# Patient Record
Sex: FEMALE | Race: WHITE
[De-identification: names, ages, dates, MRNs, and addresses within clinical notes are randomized per-mention and may not be internally consistent; named-entity substitution may affect disease eponyms.]

---

## 2017-01-01 ENCOUNTER — HOSPITAL ENCOUNTER (EMERGENCY)
Dept: HOSPITAL 62 - ER | Age: 57
Discharge: LEFT BEFORE DISCHARGE | End: 2017-01-01
Payer: SELF-PAY

## 2017-01-01 DIAGNOSIS — Z53.21: Primary | ICD-10-CM

## 2017-04-15 ENCOUNTER — HOSPITAL ENCOUNTER (EMERGENCY)
Dept: HOSPITAL 62 - ER | Age: 57
Discharge: HOME | End: 2017-04-15
Payer: SELF-PAY

## 2017-04-15 VITALS — SYSTOLIC BLOOD PRESSURE: 139 MMHG | DIASTOLIC BLOOD PRESSURE: 88 MMHG

## 2017-04-15 DIAGNOSIS — R07.89: ICD-10-CM

## 2017-04-15 DIAGNOSIS — M54.5: ICD-10-CM
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
## 2017-04-15 DIAGNOSIS — F17.210: ICD-10-CM

## 2017-04-15 DIAGNOSIS — R07.9: Primary | ICD-10-CM

## 2017-04-15 LAB
ALBUMIN SERPL-MCNC: 4.3 G/DL (ref 3.5–5)
ALP SERPL-CCNC: 91 U/L (ref 38–126)
ALT SERPL-CCNC: 26 U/L (ref 9–52)
ANION GAP SERPL CALC-SCNC: 12 MMOL/L (ref 5–19)
AST SERPL-CCNC: 19 U/L (ref 14–36)
BASOPHILS # BLD AUTO: 0.1 10^3/UL (ref 0–0.2)
BASOPHILS NFR BLD AUTO: 0.8 % (ref 0–2)
BILIRUB DIRECT SERPL-MCNC: 0.1 MG/DL (ref 0–0.4)
BILIRUB SERPL-MCNC: 0.8 MG/DL (ref 0.2–1.3)
BUN SERPL-MCNC: 12 MG/DL (ref 7–20)
CALCIUM: 9.4 MG/DL (ref 8.4–10.2)
CHLORIDE SERPL-SCNC: 104 MMOL/L (ref 98–107)
CK MB SERPL-MCNC: < 0.22 NG/ML (ref ?–4.55)
CK SERPL-CCNC: 41 U/L (ref 30–135)
CO2 SERPL-SCNC: 26 MMOL/L (ref 22–30)
CREAT SERPL-MCNC: 0.57 MG/DL (ref 0.52–1.25)
EOSINOPHIL # BLD AUTO: 0.1 10^3/UL (ref 0–0.6)
EOSINOPHIL NFR BLD AUTO: 1.2 % (ref 0–6)
ERYTHROCYTE [DISTWIDTH] IN BLOOD BY AUTOMATED COUNT: 13.4 % (ref 11.5–14)
GLUCOSE SERPL-MCNC: 99 MG/DL (ref 75–110)
HCT VFR BLD CALC: 39.6 % (ref 36–47)
HGB BLD-MCNC: 13.8 G/DL (ref 12–15.5)
HGB HCT DIFFERENCE: 1.8
LYMPHOCYTES # BLD AUTO: 2.2 10^3/UL (ref 0.5–4.7)
LYMPHOCYTES NFR BLD AUTO: 21.4 % (ref 13–45)
MCH RBC QN AUTO: 31.5 PG (ref 27–33.4)
MCHC RBC AUTO-ENTMCNC: 34.9 G/DL (ref 32–36)
MCV RBC AUTO: 90 FL (ref 80–97)
MONOCYTES # BLD AUTO: 0.6 10^3/UL (ref 0.1–1.4)
MONOCYTES NFR BLD AUTO: 5.7 % (ref 3–13)
NEUTROPHILS # BLD AUTO: 7.2 10^3/UL (ref 1.7–8.2)
NEUTS SEG NFR BLD AUTO: 70.9 % (ref 42–78)
POTASSIUM SERPL-SCNC: 3.8 MMOL/L (ref 3.6–5)
PROT SERPL-MCNC: 7.1 G/DL (ref 6.3–8.2)
PROTHROMBIN TIME: 12.7 SEC (ref 11.4–15.4)
RBC # BLD AUTO: 4.39 10^6/UL (ref 3.72–5.28)
SODIUM SERPL-SCNC: 142.4 MMOL/L (ref 137–145)
TROPONIN I SERPL-MCNC: < 0.012 NG/ML
WBC # BLD AUTO: 10.2 10^3/UL (ref 4–10.5)

## 2017-04-15 PROCEDURE — 85025 COMPLETE CBC W/AUTO DIFF WBC: CPT

## 2017-04-15 PROCEDURE — 99285 EMERGENCY DEPT VISIT HI MDM: CPT

## 2017-04-15 PROCEDURE — 85610 PROTHROMBIN TIME: CPT

## 2017-04-15 PROCEDURE — 71010: CPT

## 2017-04-15 PROCEDURE — 93005 ELECTROCARDIOGRAM TRACING: CPT

## 2017-04-15 PROCEDURE — 82553 CREATINE MB FRACTION: CPT

## 2017-04-15 PROCEDURE — 93010 ELECTROCARDIOGRAM REPORT: CPT

## 2017-04-15 PROCEDURE — 36415 COLL VENOUS BLD VENIPUNCTURE: CPT

## 2017-04-15 PROCEDURE — 82550 ASSAY OF CK (CPK): CPT

## 2017-04-15 PROCEDURE — 80053 COMPREHEN METABOLIC PANEL: CPT

## 2017-04-15 PROCEDURE — 84484 ASSAY OF TROPONIN QUANT: CPT
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
## 2017-04-15 NOTE — EKG REPORT
SEVERITY:- OTHERWISE NORMAL ECG -

SINUS RHYTHM

LOW VOLTAGE IN FRONTAL LEADS

:

Confirmed by: Ralph Waterman MD 15-Apr-2017 18:05:43

## 2017-12-30 ENCOUNTER — HOSPITAL ENCOUNTER (EMERGENCY)
Dept: HOSPITAL 62 - ER | Age: 57
Discharge: LEFT BEFORE DISCHARGE | End: 2017-12-30
Payer: SELF-PAY

## 2017-12-30 VITALS — DIASTOLIC BLOOD PRESSURE: 77 MMHG | SYSTOLIC BLOOD PRESSURE: 143 MMHG

## 2017-12-30 DIAGNOSIS — R11.2: ICD-10-CM

## 2017-12-30 DIAGNOSIS — I10: ICD-10-CM

## 2017-12-30 DIAGNOSIS — R51: ICD-10-CM

## 2017-12-30 DIAGNOSIS — J44.9: ICD-10-CM

## 2017-12-30 DIAGNOSIS — R42: Primary | ICD-10-CM

## 2017-12-30 PROCEDURE — 93005 ELECTROCARDIOGRAM TRACING: CPT

## 2017-12-30 PROCEDURE — 99284 EMERGENCY DEPT VISIT MOD MDM: CPT

## 2017-12-30 PROCEDURE — 93010 ELECTROCARDIOGRAM REPORT: CPT

## 2017-12-30 NOTE — ER DOCUMENT REPORT
ED Medical Screen (RME)





- General


Chief Complaint: Vomiting


Stated Complaint: DIZZY, VOMITING


Time Seen by Provider: 12/30/17 18:29


Information source: Patient


Notes: 





57-year-old female who presents today with the onset yesterday of a mild 

frontal headache and some "dizziness".  When I asked her to describe this she 

explains both lightheadedness and vertigo.  She denies any blurry vision, neck 

pain, chest pain, abdominal pain, weakness or numbness.  She does state some 

nausea and vomiting without diarrhea.


TRAVEL OUTSIDE OF THE [place] IN LAST 30 DAYS: No





- HPI


Onset: Other - See above


Onset/Duration: Gradual


Quality of pain: Achy


Severity: Mild


Pain Level: 1


Associated Symptoms: Other - See above


Exacerbated by: Denies


Relieved by: Denies


Similar symptoms previously: Yes


Recently seen / treated by doctor: No





- Related Data


Allergies/Adverse Reactions: 


 





No Known Allergies Allergy (Verified 12/30/17 16:52)


 











Past Medical History





- General


Information source: Patient





- Social History


Cigarette use (# per day): No


Chew tobacco use (# tins/day): No


Frequency of alcohol use: None


Drug Abuse: None


Family history: CVA





- Past Medical History


Cardiac Medical History: Reports: Hx Hypertension - not taking meds


Pulmonary Medical History: Reports: Hx Bronchitis, Hx COPD


Renal/ Medical History: Denies: Hx Peritoneal Dialysis


Musculoskeltal Medical History: Reports Hx Arthritis


Psychiatric Medical History: Reports: Hx Depression


Past Surgical History: Reports: Hx Gynecologic Surgery - part of cervix, Hx 

Hysterectomy





- Immunizations


Hx Diphtheria, Pertussis, Tetanus Vaccination: Yes





Review of Systems





- Review of Systems


Constitutional: denies: Fever


EENT: denies: Eye discharge, Nose discharge


Cardiovascular: denies: Chest pain, Palpitations


Respiratory: denies: Cough, Short of breath


Gastrointestinal: Vomiting


Genitourinary: denies: Dysuria


Musculoskeletal: denies: Leg swelling


Skin: Other - no hives.  denies: Rash


-: Yes All other systems reviewed and negative





Physical Exam





- Vital signs


Vitals: 





 











Temp Pulse Resp BP Pulse Ox


 


 98.5 F   73   20   143/77 H  98 


 


 12/30/17 17:05  12/30/17 17:05  12/30/17 17:05  12/30/17 17:05  12/30/17 17:05











Notes: 





Reviewed vital signs and nursing note as charted by RN.





CONSTITUTIONAL: Alert and oriented and responds appropriately to questions. Well

-appearing; well-nourished





HEAD: Normocephalic; atraumatic





EYES: PERRL; Conjunctivae clear, sclerae non-icteric





ENT: Normal nose; no rhinorrhea; moist mucous membranes; pharynx without 

lesions noted





NECK: Supple without meningismus; carotid bruit; non-tender; no cervical 

lymphadenopathy, no masses





CARD: Regular rate and rhythm; no murmurs





RESP: Normal chest excursion without splinting or tachypnea; breath sounds 

clear and equal bilaterally





ABD/GI: Normal bowel sounds; non-distended; soft, non-tender





BACK: The back appears normal and is non-tender to palpation, there is no CVA 

tenderness





EXT: Normal ROM in all joints; non-tender to palpation; no cyanosis, no 

effusions, no edema





SKIN: No acute lesions noted





NEURO: CN II through XII are intact.  Patient has 5 out of 5 bilateral upper 

and lower extremity strength with sensation intact light touch.  No nystagmus.  

Normal finger to nose














Course





- Re-evaluation


Re-evalutation: 





12/30/17 18:40


Given the above history and physical examination, I would like to rule out the 

possibility of a CVA causing the vertigo as well as an EKG, basic labs, with 

reassessment.





Patient is very upset that she had a weight in the waiting room greater than 1 

hour.  She feels that she would like to be in a room and not in the triage 

room.  I have explained to her that this helps expedite the process so when a 

room is ready laboratory values and imaging already.





Patient states that she knows that she has "vertigo" she has had this before.  

I have explained to her that I would like to rule out the possibility of a more 

serious concern given the signs and symptoms of vertigo that often mimic a 

posterior stroke.  I have explained to her that if the workup is unremarkable, 

I will indeed treat her for vertigo.





Despite our attempts to make the patient stay for complete workup she is 

leaving AGAINST MEDICAL ADVICE.  She understands the risks and benefits.  

Patient is oriented 4 and has capacity to make this decision in my judgment.  

She has been welcomed to return at any time that she would like for further 

assessment.





- Vital Signs


Vital signs: 





 











Temp Pulse Resp BP Pulse Ox


 


 98.5 F   73   20   143/77 H  98 


 


 12/30/17 17:05  12/30/17 17:05  12/30/17 17:05  12/30/17 17:05  12/30/17 17:05














Doctor's Discharge





- Discharge


Clinical Impression: 


 Dizziness





Vomiting


Qualifiers:


 Vomiting type: unspecified Vomiting Intractability: non-intractable Nausea 

presence: with nausea Qualified Code(s): R11.2 - Nausea with vomiting, 

unspecified





Condition: Fair


Disposition: AGAINST MEDICAL ADVICE


Additional Instructions: 


Please feel free to return at anytime that you would like for further 

assessment and evaluation.  Please make sure you follow-up with your primary 

care physician as we have discussed.


Prescriptions: 


Meclizine HCl [Antivert 25 mg Tablet] 25 mg PO TID PRN #21 tablet


 PRN Reason:

## 2017-12-30 NOTE — ER DOCUMENT REPORT
ED Medical Screen (RME)





- General


Chief Complaint: Vomiting


Stated Complaint: DIZZY, VOMITING


Time Seen by Provider: 12/30/17 18:29


Information source: Patient


Notes: 





57-year-old female who presents today with the onset yesterday of a mild 

frontal headache and some "dizziness".  When I asked her to describe this she 

explains both lightheadedness and vertigo.  She denies any blurry vision, neck 

pain, chest pain, abdominal pain, weakness or numbness.  She does state some 

nausea and vomiting without diarrhea.








TRAVEL OUTSIDE OF THE [place] IN LAST 30 DAYS: No





- Related Data


Allergies/Adverse Reactions: 


 





No Known Allergies Allergy (Verified 12/30/17 16:52)


 











Past Medical History





- Past Medical History


Cardiac Medical History: Reports: Hx Hypertension - not taking meds


Pulmonary Medical History: Reports: Hx Bronchitis, Hx COPD


Renal/ Medical History: Denies: Hx Peritoneal Dialysis


Musculoskeltal Medical History: Reports Hx Arthritis


Psychiatric Medical History: Reports: Hx Depression


Past Surgical History: Reports: Hx Gynecologic Surgery - part of cervix, Hx 

Hysterectomy - partial





- Immunizations


Hx Diphtheria, Pertussis, Tetanus Vaccination: Yes





Physical Exam





- Vital signs


Vitals: 





 











Temp Pulse Resp BP Pulse Ox


 


 98.5 F   73   20   143/77 H  98 


 


 12/30/17 17:05  12/30/17 17:05  12/30/17 17:05  12/30/17 17:05  12/30/17 17:05














Course





- Vital Signs


Vital signs: 





 











Temp Pulse Resp BP Pulse Ox


 


 98.5 F   73   20   143/77 H  98 


 


 12/30/17 17:05  12/30/17 17:05  12/30/17 17:05  12/30/17 17:05  12/30/17 17:05

## 2017-12-31 ENCOUNTER — HOSPITAL ENCOUNTER (EMERGENCY)
Dept: HOSPITAL 62 - ER | Age: 57
Discharge: HOME | End: 2017-12-31
Payer: SELF-PAY

## 2017-12-31 VITALS — DIASTOLIC BLOOD PRESSURE: 78 MMHG | SYSTOLIC BLOOD PRESSURE: 168 MMHG

## 2017-12-31 DIAGNOSIS — R42: ICD-10-CM

## 2017-12-31 DIAGNOSIS — K52.9: ICD-10-CM

## 2017-12-31 DIAGNOSIS — I10: ICD-10-CM

## 2017-12-31 DIAGNOSIS — R11.2: ICD-10-CM

## 2017-12-31 DIAGNOSIS — N39.0: Primary | ICD-10-CM

## 2017-12-31 DIAGNOSIS — R10.9: ICD-10-CM

## 2017-12-31 DIAGNOSIS — R51: ICD-10-CM

## 2017-12-31 LAB
ADD MANUAL DIFF: NO
ALBUMIN SERPL-MCNC: 4.5 G/DL (ref 3.5–5)
ALP SERPL-CCNC: 86 U/L (ref 38–126)
ALT SERPL-CCNC: 30 U/L (ref 9–52)
ANION GAP SERPL CALC-SCNC: 15 MMOL/L (ref 5–19)
APPEARANCE UR: (no result)
APTT PPP: (no result) S
AST SERPL-CCNC: 17 U/L (ref 14–36)
BASOPHILS # BLD AUTO: 0 10^3/UL (ref 0–0.2)
BASOPHILS NFR BLD AUTO: 0.5 % (ref 0–2)
BILIRUB DIRECT SERPL-MCNC: 0.3 MG/DL (ref 0–0.4)
BILIRUB SERPL-MCNC: 1.3 MG/DL (ref 0.2–1.3)
BILIRUB UR QL STRIP: NEGATIVE
BUN SERPL-MCNC: 14 MG/DL (ref 7–20)
CALCIUM: 10.5 MG/DL (ref 8.4–10.2)
CHLORIDE SERPL-SCNC: 105 MMOL/L (ref 98–107)
CO2 SERPL-SCNC: 24 MMOL/L (ref 22–30)
EOSINOPHIL # BLD AUTO: 0 10^3/UL (ref 0–0.6)
EOSINOPHIL NFR BLD AUTO: 0.1 % (ref 0–6)
ERYTHROCYTE [DISTWIDTH] IN BLOOD BY AUTOMATED COUNT: 13.1 % (ref 11.5–14)
GLUCOSE SERPL-MCNC: 115 MG/DL (ref 75–110)
GLUCOSE UR STRIP-MCNC: NEGATIVE MG/DL
HCT VFR BLD CALC: 44.7 % (ref 36–47)
HGB BLD-MCNC: 15.4 G/DL (ref 12–15.5)
KETONES UR STRIP-MCNC: 20 MG/DL
LIPASE SERPL-CCNC: 75.4 U/L (ref 23–300)
LYMPHOCYTES # BLD AUTO: 1.3 10^3/UL (ref 0.5–4.7)
LYMPHOCYTES NFR BLD AUTO: 13.7 % (ref 13–45)
MCH RBC QN AUTO: 31.4 PG (ref 27–33.4)
MCHC RBC AUTO-ENTMCNC: 34.4 G/DL (ref 32–36)
MCV RBC AUTO: 91 FL (ref 80–97)
MONOCYTES # BLD AUTO: 0.2 10^3/UL (ref 0.1–1.4)
MONOCYTES NFR BLD AUTO: 2.5 % (ref 3–13)
NEUTROPHILS # BLD AUTO: 8 10^3/UL (ref 1.7–8.2)
NEUTS SEG NFR BLD AUTO: 83.2 % (ref 42–78)
NITRITE UR QL STRIP: NEGATIVE
PH UR STRIP: 7 [PH] (ref 5–9)
PLATELET # BLD: 227 10^3/UL (ref 150–450)
POTASSIUM SERPL-SCNC: 3.9 MMOL/L (ref 3.6–5)
PROT SERPL-MCNC: 7.6 G/DL (ref 6.3–8.2)
PROT UR STRIP-MCNC: 100 MG/DL
RBC # BLD AUTO: 4.89 10^6/UL (ref 3.72–5.28)
SODIUM SERPL-SCNC: 143.7 MMOL/L (ref 137–145)
SP GR UR STRIP: 1.02
TOTAL CELLS COUNTED % (AUTO): 100 %
UROBILINOGEN UR-MCNC: 4 MG/DL (ref ?–2)
WBC # BLD AUTO: 9.6 10^3/UL (ref 4–10.5)

## 2017-12-31 PROCEDURE — 70450 CT HEAD/BRAIN W/O DYE: CPT

## 2017-12-31 PROCEDURE — 99284 EMERGENCY DEPT VISIT MOD MDM: CPT

## 2017-12-31 PROCEDURE — 85025 COMPLETE CBC W/AUTO DIFF WBC: CPT

## 2017-12-31 PROCEDURE — 80053 COMPREHEN METABOLIC PANEL: CPT

## 2017-12-31 PROCEDURE — 87086 URINE CULTURE/COLONY COUNT: CPT

## 2017-12-31 PROCEDURE — S0119 ONDANSETRON 4 MG: HCPCS

## 2017-12-31 PROCEDURE — 87186 SC STD MICRODIL/AGAR DIL: CPT

## 2017-12-31 PROCEDURE — 96361 HYDRATE IV INFUSION ADD-ON: CPT

## 2017-12-31 PROCEDURE — 36415 COLL VENOUS BLD VENIPUNCTURE: CPT

## 2017-12-31 PROCEDURE — 96374 THER/PROPH/DIAG INJ IV PUSH: CPT

## 2017-12-31 PROCEDURE — 81001 URINALYSIS AUTO W/SCOPE: CPT

## 2017-12-31 PROCEDURE — 83690 ASSAY OF LIPASE: CPT

## 2017-12-31 PROCEDURE — 87088 URINE BACTERIA CULTURE: CPT

## 2017-12-31 NOTE — RADIOLOGY REPORT (SQ)
EXAM DESCRIPTION:  CT HEAD WITHOUT



COMPLETED DATE/TIME:  12/31/2017 8:14 pm



REASON FOR STUDY:  dizziness



COMPARISON:  October 2016



TECHNIQUE:  Axial images acquired through the brain without intravenous contrast.  Images reviewed wi
th bone, brain and subdural windows.  Images stored on PACS.

All CT scanners at this facility use dose modulation, iterative reconstruction, and/or weight based d
osing when appropriate to reduce radiation dose to as low as reasonably achievable (ALARA).

[facility]: Dose Right  [facility]: CareDose    [facility]: Dose Right    [facility]: Teradose 4D    [facility]: Smart Technologies



RADIATION DOSE:  CT Rad equipment meets quality standard of care and radiation dose reduction techniq
ues were employed. CTDIvol: 64.6 mGy. DLP: 1034 mGy-cm. mGy.



LIMITATIONS:  None.



FINDINGS:  VENTRICLES: Normal size and contour.

CEREBRUM: No masses.  No hemorrhage.  No midline shift.  No evidence for acute infarction. Normal gra
y/white matter differentiation. No areas of low density in the white matter.

CEREBELLUM: No masses.  No hemorrhage.  No alteration of density.  No evidence for acute infarction.

EXTRAAXIAL SPACES: No fluid collections.  No masses.

ORBITS AND GLOBE: No intra- or extraconal masses.  Normal contour of globe without masses.

CALVARIUM: No fracture.

PARANASAL SINUSES: Mucosal polyp retention cyst is identified in the left maxillary antra.

SOFT TISSUES: No mass or hematoma.

OTHER: No other significant finding.



IMPRESSION:  NORMAL BRAIN CT WITHOUT CONTRAST.

EVIDENCE OF ACUTE STROKE: NO.



COMMENT:  Quality ID # [id number]: Final reports with documentation of one or more dose reduction techniques
 (e.g., Automated exposure control, adjustment of the mA and/or kV according to patient size, use of 
iterative reconstruction technique)



TECHNICAL DOCUMENTATION:  JOB ID:  [id number]

 [employer]- All Rights Reserved

## 2017-12-31 NOTE — ER DOCUMENT REPORT
ED General





- General


Chief Complaint: Nausea/Vomiting


Stated Complaint: NAUSEA AND VOMITING


Time Seen by Provider: 12/31/17 16:58


TRAVEL OUTSIDE OF THE [place] IN LAST 30 DAYS: No





- HPI


Notes: 





Patient is a 57-year-old female with a history of hypertension who presents the 

ED complaining of 2 days of feeling dizzy, nausea/vomiting without abdominal 

pain, and a right frontal mild headache.  Patient was evaluated yesterday while 

in the ED and left AMA.  The provider yesterday wanted to perform labs and 

imaging to rule out posterior CVA and other etiologies before diagnosing vertigo

, but patient declined.  Patient returns today stating that her dizziness has 

increased she has not been able to keep any fluids or food down.  Patient 

states that she is still urinating and having normal bowel movements.  Patient 

states that she has occasional nasal congestion patient states that the 

dizziness is worse with.  Sitting and standing, and is improved with lying 

down.  Patient states that she did have some medicine when she arrived to the 

ED today, Zofran and meclizine, which helped.  Patient states that she is open 

to having testing performed today.  She denies any drug allergies.  Denies any 

cardiac history, CVA, TIA, cancer, blood clots.  Denies any fever, head injury, 

neck pain, changes in vision/speech/mentation/hearing, sore throat, chest pain, 

palpitations, syncope, cough, shortness of breath, wheeze, dyspnea, abdominal 

pain, nausea/vomiting/diarrhea, urinary retention, dysuria, hematuria, loss of 

control of bowel or bladder, numbness/tingling, saddle anesthesia, muscle 

paralysis/weakness, or rash.





- Related Data


Allergies/Adverse Reactions: 


 





No Known Allergies Allergy (Verified 12/31/17 16:23)


 











Past Medical History





- Social History


Smoking Status: Never Smoker


Chew tobacco use (# tins/day): No


Frequency of alcohol use: None


Drug Abuse: None


Family History: Reviewed & Not Pertinent


Patient has suicidal ideation: No


Patient has homicidal ideation: No





- Past Medical History


Cardiac Medical History: Reports: Hx Hypertension - not taking meds


Pulmonary Medical History: Reports: Hx Bronchitis, Hx COPD


Renal/ Medical History: Denies: Hx Peritoneal Dialysis


Musculoskeltal Medical History: Reports Hx Arthritis


Psychiatric Medical History: Reports: Hx Depression


Past Surgical History: Reports: Hx Gynecologic Surgery - part of cervix, Hx 

Hysterectomy





- Immunizations


Hx Diphtheria, Pertussis, Tetanus Vaccination: Yes





Review of Systems





- Review of Systems


Notes: 





REVIEW OF SYSTEMS:


CONSTITUTIONAL :  Denies fever,  chills, or sweats.  Denies recent illness.


EENT:  see hpi


CARDIOVASCULAR:  Denies chest pain.  Denies palpitations or racing or irregular 

heart beat.  Denies ankle edema.


RESPIRATORY:  Denies cough, cold, or chest congestion.  Denies shortness of 

breath, difficulty breathing, or wheezing.


GASTROINTESTINAL:  see hpi


GENITOURINARY:  Denies difficulty urinating, painful urination, burning, 

frequency, blood in urine, or discharge.


MUSCULOSKELETAL:  Denies back or neck pain or stiffness.  Denies joint pain or 

swelling.


SKIN:   Denies rash, lesions or sores.


NEUROLOGICAL:  see hpi. Denies confusion or altered mental status.  Denies 

passing out or loss of consciousness.  Denies weakness or paralysis or loss of 

use of either side.  Denies problems with gait or speech.  Denies sensory loss, 

numbness, or tingling.  Denies seizures.


PSYCHIATRIC:  Denies anxiety or stress.  Denies depression, suicidal ideation, 

or homicidal ideation.





ALL OTHER SYSTEMS REVIEWED AND NEGATIVE.





Dictation was performed using Dragon voice recognition software





Physical Exam





- Vital signs


Vitals: 


 











Temp Pulse Resp BP Pulse Ox


 


 98.5 F   70   18   168/78 H  99 


 


 12/31/17 17:01  12/31/17 17:01  12/31/17 17:01  12/31/17 17:01  12/31/17 17:01











Notes: 





PHYSICAL EXAMINATION:





GENERAL: Well-appearing, well-nourished and in no acute distress.  A&Ox4





HEAD: Atraumatic, normocephalic.  Non-tender.  No francisco sign





EYES: Pupils equal round and reactive to light, extraocular movements intact, 

sclera anicteric, conjunctiva are normal.  No nystagmus. vis fields intact.





ENT: EAC clear b/l.  TM's intact b/l without erythema, fluid, or perforation.  

Nares patent and without discharge.  oropharynx clear without exudates.  No 

tonsilar hypertrophy or erythema.  Moist mucous membranes.  No sinus 

tenderness.  





NECK: Normal range of motion, supple without lymphadenopathy.  No rigidity.  No 

midline tenderness. Spurling negative.  





LUNGS: Breath sounds clear to auscultation bilaterally and equal.  No wheezes 

rales or rhonchi.





HEART: Regular rate and rhythm without murmurs, rubs, gallops.





ABDOMEN: Soft, nontender, nondistended abdomen.  No guarding, no rebound.  No 

masses appreciated.  Normal bowel sounds present.  No CVA tenderness 

bilaterally.   





Musculoskeletal: Ext b/l:  FROM to passive/active. Strength 5+/5.  No deficits 

noted.  No bony tenderness of extremities.





Back:  FROM to passive/active.  Strength 5+/5.  No vertebral point tenderness, 

stepoffs, or deformities.  No other bony tenderness or ecchymosis.  





Extremities:  No cyanosis, clubbing, or edema b/l.  Peripheral pulses 2+.  

Capillary refill less than 2 seconds.





NEUROLOGICAL: GCS 15. NIH 0. MMSE intact.  Cranial nerves grossly intact.  

Normal speech, normal gait.  Normal sensory, motor exams.  Reflexes 2+ b/l. GUILLAUME'

s negative.  Pronator drift negative. Heel/shin, finger/nose wnl. 





PSYCH: Normal mood, normal affect.





SKIN: Warm, Dry, normal turgor, no rashes or lesions noted.





Course





- Re-evaluation


Re-evalutation: 





12/31/17 21:18


Patient is an afebrile, well-hydrated, 57-year-old female who presents to the 

ED with vertigo, possible gastroenteritis, and acute UTI based on H&P today.  

Vitals are stable.  PE is otherwise unremarkable.  Pt is tolerating PO and has 

not vomited while in the ED.  Her abdominal exam was unremarkable.  Patient has 

no focal neurological deficits, GCS 15, MMSE intact, & NIH 0.  CT scan of the 

head was unremarkable for acute pathology.  EKG, CBC, CMP, lipase unremarkable 

for any acute pathology.  See urinalysis results.  Urine culture is pending.  

Patient was given 2 L normal saline today as well as Zofran and meclizine.  

Patient does have a prescription at home for her meclizine.  Rocephin 1 g given 

IV today along with her first dose of Keflex.  Low suspicion for any acute 

glaucoma, temporal arteritis, meningitis, intracranial hemorrhage, ischemic 

stroke, fracture, acute appendicitis, bowel obstruction, acute cholecystitis, 

acute cholangitis, perforated diverticulitis, incarcerated hernia, pancreatitis

, perforated ulcer, peritonitis, sepsis, pelvic inflammatory disease, ectopic 

pregnancy, tubo-ovarian abscess, ovarian torsion, or other systemic emergent 

condition at this time.  Patient is aware that her condition can change from 

initial presentation and she needs to monitor symptoms closely and seek medical 

attention if any acute changes.  I will send her home with a Rx for Keflex to 

take as directed.  Conservative measures otherwise for symptoms. Recheck with 

your PCM in 3-5 days.  Consider consult with a gastroenterologist.  Return to 

the ED with any worsening/concerning symptoms otherwise as reviewed in 

discharge.  Patient is in agreement.  Reviewed with Dr. Valdez who is in 

agreement with discharge/plan.











- Vital Signs


Vital signs: 


 











Temp Pulse Resp BP Pulse Ox


 


 98.5 F   70   18   168/78 H  99 


 


 12/31/17 17:01  12/31/17 17:01  12/31/17 17:01  12/31/17 17:01  12/31/17 17:01














- Laboratory


Result Diagrams: 


 12/31/17 20:20





 12/31/17 20:20


Laboratory results interpreted by me: 


 











  12/31/17 12/31/17 12/31/17





  20:20 20:20 20:20


 


Seg Neutrophils %  83.2 H  


 


Monocytes %  2.5 L  


 


Glucose   115 H 


 


Calcium   10.5 H 


 


Urine Protein    100 H


 


Urine Ketones    20 H


 


Urine Urobilinogen    4.0 H


 


Ur Leukocyte Esterase    TRACE H














Discharge





- Discharge


Clinical Impression: 


 Acute UTI, Vertigo, Gastroenteritis





Condition: Stable


Disposition: HOME, SELF-CARE


Instructions:  Antinausea Medication ([facility]), Cephalexin ([facility]), Gastroenteritis (

adult) ([facility]), Urinary Tract Infection ([facility]), Vertigo ([facility]), Vomiting ([facility])


Additional Instructions: 


Push fluids (i.e. water, cranberry juice)


Proper hygenic technique


[place] diet (Bananas, rice, apples, toast, etc)


Keep the skin clean


Tylenol as needed


May use over the counter AZO for burning with urination


Take medications as directed


F/u with your PCM in 3-5 days for a recheck


Consider consult with a Urologist/Neurologist for ongoing/worsening symptoms.


Return to the ED with any worsening symptoms and/or development of fever, 

headache, chest pain, palpitations, syncope, shortness of breath, trouble 

breathing, abdominal pain, n/v/d, blood in stool/urine, loss of control of bowel

/bladder, urinary retention, or other worsening symptoms that are concerning to 

you.


Prescriptions: 


Cephalexin Monohydrate [Keflex 500 mg Capsule] 500 mg PO BID #14 capsule


Ondansetron [Zofran Odt 4 mg Tablet] 1 - 2 tab PO Q4H PRN #15 tab.rapdis


 PRN Reason: For Nausea/Vomiting


Forms:  Elevated Blood Pressure


Referrals: 


AYSE WHITE MD [EMERITUS] - Follow up as needed


[facility] [Provider Group] - Follow up as needed

## 2017-12-31 NOTE — ER DOCUMENT REPORT
ED Medical Screen (E)





- General


Chief Complaint: Nausea/Vomiting


Stated Complaint: NAUSEA AND VOMITING


Time Seen by Provider: 12/31/17 16:58


Notes: 


This 57-year-old female patient comes emergency room complaining of nausea 

vomiting dizziness.  She was seen here last night for this, seen in the [facility] 

area.  She did have a prescription of meclizine written.  She did refused to 

have a workup for possible posterior circulation stroke.  He was certain it was 

just vertigo according to the records from last night.  She did get a 

prescription for meclizine but did not fill it and has been taking nothing.  

She is complaining about her symptoms getting worse, but does admit that she is 

taking nothing for it.


She states when she turns her head the dizziness gets much worse.


She seemed to be a difficult, very angry person.





I have greeted and performed a rapid initial assessment of this patient.  A 

comprehensive ED assessment and evaluation of the patient, analysis of test 

results and completion of the medical decision making process will be conducted 

by additional ED providers.


TRAVEL OUTSIDE OF THE [place] IN LAST 30 DAYS: No





- Related Data


Allergies/Adverse Reactions: 


 





No Known Allergies Allergy (Verified 12/31/17 16:23)


 











Past Medical History





- Social History


Chew tobacco use (# tins/day): No


Frequency of alcohol use: None


Drug Abuse: None


Family history: CVA





- Past Medical History


Cardiac Medical History: Reports: Hx Hypertension - not taking meds


Pulmonary Medical History: Reports: Hx Bronchitis, Hx COPD


Renal/ Medical History: Denies: Hx Peritoneal Dialysis


Musculoskeltal Medical History: Reports Hx Arthritis


Psychiatric Medical History: Reports: Hx Depression


Past Surgical History: Reports: Hx Gynecologic Surgery - part of cervix, Hx 

Hysterectomy





- Immunizations


Hx Diphtheria, Pertussis, Tetanus Vaccination: Yes





Physical Exam





- Vital signs


Vitals: 





 











Temp Pulse Resp BP Pulse Ox


 


 98.5 F   70   18   168/78 H  99 


 


 12/31/17 17:01  12/31/17 17:01  12/31/17 17:01  12/31/17 17:01  12/31/17 17:01














Course





- Vital Signs


Vital signs: 





 











Temp Pulse Resp BP Pulse Ox


 


 98.5 F   70   18   168/78 H  99 


 


 12/31/17 17:01  12/31/17 17:01  12/31/17 17:01  12/31/17 17:01  12/31/17 17:01

## 2018-07-02 ENCOUNTER — HOSPITAL ENCOUNTER (EMERGENCY)
Dept: HOSPITAL 62 - ER | Age: 58
Discharge: HOME | End: 2018-07-02
Payer: SELF-PAY

## 2018-07-02 VITALS — DIASTOLIC BLOOD PRESSURE: 72 MMHG | SYSTOLIC BLOOD PRESSURE: 152 MMHG

## 2018-07-02 DIAGNOSIS — R42: ICD-10-CM

## 2018-07-02 DIAGNOSIS — I10: ICD-10-CM

## 2018-07-02 DIAGNOSIS — J32.9: Primary | ICD-10-CM

## 2018-07-02 DIAGNOSIS — N39.0: ICD-10-CM

## 2018-07-02 DIAGNOSIS — J44.9: ICD-10-CM

## 2018-07-02 LAB
ADD MANUAL DIFF: NO
ALBUMIN SERPL-MCNC: 4.4 G/DL (ref 3.5–5)
ALP SERPL-CCNC: 84 U/L (ref 38–126)
ALT SERPL-CCNC: 19 U/L (ref 9–52)
ANION GAP SERPL CALC-SCNC: 11 MMOL/L (ref 5–19)
APPEARANCE UR: (no result)
APTT PPP: YELLOW S
AST SERPL-CCNC: 18 U/L (ref 14–36)
BASOPHILS # BLD AUTO: 0.1 10^3/UL (ref 0–0.2)
BASOPHILS NFR BLD AUTO: 0.9 % (ref 0–2)
BILIRUB DIRECT SERPL-MCNC: 0.4 MG/DL (ref 0–0.4)
BILIRUB SERPL-MCNC: 1.3 MG/DL (ref 0.2–1.3)
BILIRUB UR QL STRIP: NEGATIVE
BUN SERPL-MCNC: 11 MG/DL (ref 7–20)
CALCIUM: 9.5 MG/DL (ref 8.4–10.2)
CHLORIDE SERPL-SCNC: 104 MMOL/L (ref 98–107)
CK MB SERPL-MCNC: 0.49 NG/ML (ref ?–4.55)
CK SERPL-CCNC: 46 U/L (ref 30–135)
CO2 SERPL-SCNC: 29 MMOL/L (ref 22–30)
EOSINOPHIL # BLD AUTO: 0.1 10^3/UL (ref 0–0.6)
EOSINOPHIL NFR BLD AUTO: 1.8 % (ref 0–6)
ERYTHROCYTE [DISTWIDTH] IN BLOOD BY AUTOMATED COUNT: 13.2 % (ref 11.5–14)
GLUCOSE SERPL-MCNC: 98 MG/DL (ref 75–110)
GLUCOSE UR STRIP-MCNC: NEGATIVE MG/DL
HCT VFR BLD CALC: 42.4 % (ref 36–47)
HGB BLD-MCNC: 14.6 G/DL (ref 12–15.5)
KETONES UR STRIP-MCNC: NEGATIVE MG/DL
LYMPHOCYTES # BLD AUTO: 1.7 10^3/UL (ref 0.5–4.7)
LYMPHOCYTES NFR BLD AUTO: 23.6 % (ref 13–45)
MCH RBC QN AUTO: 31.6 PG (ref 27–33.4)
MCHC RBC AUTO-ENTMCNC: 34.4 G/DL (ref 32–36)
MCV RBC AUTO: 92 FL (ref 80–97)
MONOCYTES # BLD AUTO: 0.3 10^3/UL (ref 0.1–1.4)
MONOCYTES NFR BLD AUTO: 4.5 % (ref 3–13)
NEUTROPHILS # BLD AUTO: 4.9 10^3/UL (ref 1.7–8.2)
NEUTS SEG NFR BLD AUTO: 69.2 % (ref 42–78)
NITRITE UR QL STRIP: POSITIVE
PH UR STRIP: 8 [PH] (ref 5–9)
PLATELET # BLD: 212 10^3/UL (ref 150–450)
POTASSIUM SERPL-SCNC: 4.6 MMOL/L (ref 3.6–5)
PROT SERPL-MCNC: 7.6 G/DL (ref 6.3–8.2)
PROT UR STRIP-MCNC: NEGATIVE MG/DL
RBC # BLD AUTO: 4.61 10^6/UL (ref 3.72–5.28)
SODIUM SERPL-SCNC: 143.7 MMOL/L (ref 137–145)
SP GR UR STRIP: 1.01
TOTAL CELLS COUNTED % (AUTO): 100 %
TROPONIN I SERPL-MCNC: < 0.012 NG/ML
UROBILINOGEN UR-MCNC: 2 MG/DL (ref ?–2)
WBC # BLD AUTO: 7.1 10^3/UL (ref 4–10.5)

## 2018-07-02 PROCEDURE — 85025 COMPLETE CBC W/AUTO DIFF WBC: CPT

## 2018-07-02 PROCEDURE — 87088 URINE BACTERIA CULTURE: CPT

## 2018-07-02 PROCEDURE — 80053 COMPREHEN METABOLIC PANEL: CPT

## 2018-07-02 PROCEDURE — 99284 EMERGENCY DEPT VISIT MOD MDM: CPT

## 2018-07-02 PROCEDURE — 87086 URINE CULTURE/COLONY COUNT: CPT

## 2018-07-02 PROCEDURE — 70450 CT HEAD/BRAIN W/O DYE: CPT

## 2018-07-02 PROCEDURE — 82553 CREATINE MB FRACTION: CPT

## 2018-07-02 PROCEDURE — 93010 ELECTROCARDIOGRAM REPORT: CPT

## 2018-07-02 PROCEDURE — 87186 SC STD MICRODIL/AGAR DIL: CPT

## 2018-07-02 PROCEDURE — 81001 URINALYSIS AUTO W/SCOPE: CPT

## 2018-07-02 PROCEDURE — 36415 COLL VENOUS BLD VENIPUNCTURE: CPT

## 2018-07-02 PROCEDURE — 84484 ASSAY OF TROPONIN QUANT: CPT

## 2018-07-02 PROCEDURE — 93005 ELECTROCARDIOGRAM TRACING: CPT

## 2018-07-02 PROCEDURE — 82550 ASSAY OF CK (CPK): CPT

## 2018-07-02 NOTE — RADIOLOGY REPORT (SQ)
EXAM DESCRIPTION:  CT HEAD WITHOUT



COMPLETED DATE/TIME:  7/2/2018 1:36 pm



REASON FOR STUDY:  dizziness



COMPARISON:  12/1/2017.



TECHNIQUE:  Axial images acquired through the brain without intravenous contrast.  Images reviewed wi
th bone, brain and subdural windows.   Images stored on PACS.

All CT scanners at this facility use dose modulation, iterative reconstruction, and/or weight based d
osing when appropriate to reduce radiation dose to as low as reasonably achievable (ALARA).

[facility]: Dose Right  [facility]: CareDose    [facility]: Dose Right    [facility]: Teradose 4D    [facility]: Smart IdeaString



RADIATION DOSE:  CT Rad equipment meets quality standard of care and radiation dose reduction techniq
ues were employed. CTDIvol: 53.2 mGy. DLP: 991 mGy-cm. mGy.



LIMITATIONS:  None.



FINDINGS:  VENTRICLES: Normal size and contour.

CEREBRUM: No masses.  No hemorrhage.  No midline shift.  No evidence for acute infarction. Normal gra
y/white matter differentiation. No areas of low density in the white matter.

CEREBELLUM: No masses.  No hemorrhage.  No alteration of density.  No evidence for acute infarction.

EXTRAAXIAL SPACES: No fluid collections.  No masses.

ORBITS AND GLOBE: No intra- or extraconal masses.  Normal contour of globe without masses.

CALVARIUM: No fracture.

PARANASAL SINUSES: There is a retention cyst in the floor of the left maxillary sinus unchanged.  Min
imal mucosal thickening right maxillary sinus.  Nasal septal deviation to the right.

SOFT TISSUES: No mass or hematoma.

OTHER: No other significant finding.



IMPRESSION:  CHRONIC SINUSITIS.  OTHERWISE, NORMAL BRAIN CT WITHOUT CONTRAST.

EVIDENCE OF ACUTE STROKE: NO.



COMMENT:  Quality ID # [id number]: Final reports with documentation of one or more dose reduction techniques
 (e.g., Automated exposure control, adjustment of the mA and/or kV according to patient size, use of 
iterative reconstruction technique)



TECHNICAL DOCUMENTATION:  JOB ID:  [id number]

[id number]

 2011 [employer]- All Rights Reserved



Reading location - IP/workstation name: [email or web address]

## 2018-07-02 NOTE — ER DOCUMENT REPORT
ED Dizziness/Weakness





- General


Mode of Arrival: Wheelchair


Information source: Patient


TRAVEL OUTSIDE OF THE [place] IN LAST 30 DAYS: No





<GENE ALVAREZ - Last Filed: 07/02/18 15:13>





<JUAN SHEPARD - Last Filed: 07/04/18 19:13>





- General


Chief Complaint: Dizziness


Stated Complaint: DIZZINESS/HEADACHE


Time Seen by Provider: 07/02/18 13:14


Notes: 





58-year-old female presenting today with complaints of intermittent dizziness.  

Patient states she has had dizziness off and on 2-4 times a month for the last 

several years.  Patient states that last night when going to bed she had a 

funny feeling in her head and today upon awakening she felt lightheaded. 

Patient denies a history of MI or CVA. (GENE ALVAREZ)





- Related Data


Allergies/Adverse Reactions: 


 





No Known Allergies Allergy (Verified 07/02/18 13:12)


 











Past Medical History





- General


Information source: Patient





- Social History


Smoking Status: Never Smoker


Chew tobacco use (# tins/day): No


Frequency of alcohol use: None


Drug Abuse: None


Family History: Reviewed & Not Pertinent


Patient has suicidal ideation: No


Patient has homicidal ideation: No





- Past Medical History


Cardiac Medical History: Reports: Hx Hypertension - not taking meds


Pulmonary Medical History: Reports: Hx Bronchitis, Hx COPD


Renal/ Medical History: Denies: Hx Peritoneal Dialysis


Musculoskeltal Medical History: Reports Hx Arthritis


Psychiatric Medical History: Reports: Hx Depression


Past Surgical History: Reports: Hx Gynecologic Surgery - part of cervix, Hx 

Hysterectomy





- Immunizations


Hx Diphtheria, Pertussis, Tetanus Vaccination: Yes





<GENE ALVAREZ - Last Filed: 07/02/18 15:13>





- Vital signs


Vitals: 


 











Temp Pulse Resp BP Pulse Ox


 


 98.0 F   72   20   154/78 H  99 


 


 07/02/18 12:58  07/02/18 12:58  07/02/18 12:58  07/02/18 12:58  07/02/18 12:58














Course





- Laboratory


Result Diagrams: 


 07/02/18 13:45





 07/02/18 13:45





<GENE ALVAREZ - Last Filed: 07/02/18 15:13>





- Laboratory


Result Diagrams: 


 07/02/18 13:45





 07/02/18 13:45





- EKG Interpretation by Me


EKG shows normal: Sinus rhythm


Rate: Normal


Rhythm: NSR


When compared to previous EKG there are: No significant change





<JUAN SHEPARD - Last Filed: 07/04/18 19:13>





- Re-evaluation


Re-evalutation: 





07/02/18 15:01


Patient well-appearing in no acute distress.  Her symptoms of dizziness are 

more vertiginous in nature and occur when she sits up quickly in bed.  She has 

normal neurologic exam.  Her CT does show signs of chronic sinusitis.  Her 

symptoms have been going on for several years and happens several times a 

month.  These symptoms do not appear strokelike in nature.


Patient will be discharged with fluticasone nasal lasting due to her chronic 

sinusitis.  She has no family doctor will be provided community [facility] clinic 

referral for follow-up in the next 1-2 weeks for reevaluation.  Return 

precautions will be provided


07/02/18 15:03


Of note, she is nitrite positive will treat for urinary tract infection. (JUAN SHEPARD)





- Vital Signs


Vital signs: 


 











Temp Pulse Resp BP Pulse Ox


 


 97.6 F   66   16   152/72 H  98 


 


 07/02/18 15:27  07/02/18 15:27  07/02/18 15:27  07/02/18 15:27  07/02/18 15:27














- Laboratory


Laboratory results interpreted by me: 


 











  07/02/18





  13:25


 


Urine Nitrite  POSITIVE H


 


Urine Urobilinogen  2.0 H


 


Ur Leukocyte Esterase  TRACE H














Discharge





<GENE ALVAREZ - Last Filed: 07/02/18 15:13>





<JUAN SHEPARD - Last Filed: 07/04/18 19:13>





- Discharge


Clinical Impression: 


Chronic sinusitis


Qualifiers:


 Sinusitis location: unspecified location Qualified Code(s): J32.9 - Chronic 

sinusitis, unspecified





UTI (urinary tract infection)


Qualifiers:


 Urinary tract infection type: site unspecified Hematuria presence: without 

hematuria Qualified Code(s): N39.0 - Urinary tract infection, site not specified





Condition: Good


Disposition: HOME, SELF-CARE


Instructions:  Cephalexin ([facility]), Meclizine ([facility]), Sinusitis ([facility]), Urinary 

Tract Infection ([facility]), Vertigo ([facility])


Prescriptions: 


Azelastine HCl 205.5 mcg NS BID #1 bottle


Cephalexin Monohydrate [Keflex 500 mg Capsule] 500 mg PO Q6H 7 Days #28 capsule


Fluticasone Propionate 50 mcg NS BID #1 bottle


Forms:  Parent Work Note


Referrals: 


[facility] [NO LOCAL MD] - Follow up in 1 week (for re-evaluation)


Scribe Attestation: 





07/04/18 19:13


I personally performed the services described documentation, reviewed and 

edited the documentation which was dictated to describe my presence, and it 

accurately records my words and actions. (JUAN SHEPARD)





Scribe Documentation





- Scribe


Written by Renetta:: Renetta Acuna, 7/2/2018 1517


acting as scribe for :: Dilip





<GENE ALVAREZ - Last Filed: 07/02/18 15:13>

## 2018-07-02 NOTE — ER DOCUMENT REPORT
ED Medical Screen (RME)





- General


Chief Complaint: Dizziness


Stated Complaint: DIZZINESS/HEADACHE


Time Seen by Provider: 07/02/18 13:14


Mode of Arrival: Wheelchair


Information source: Patient


Notes: 





58-year-old female presents with complaints of dizziness lightheadedness.  

Patient notes symptoms like this happen every 5-6 months usually associated 

with UTI.  Patient denies any urinary symptoms at this time, she denies any 

chest pain admits to mild headache associated with








I have greeted and performed a rapid initial assessment of this patient.  A 

comprehensive ED assessment and evaluation of the patient, analysis of test 

results and completion of the medical decision making process will be conducted 

by additional ED providers.





PHYSICAL EXAMINATION:





GENERAL: Well-appearing, well-nourished and in no acute distress.





HEAD: Atraumatic, normocephalic.





EYES: Pupils equal round extraocular movements intact,  conjunctiva are normal.





ENT: Nares patent





NECK: Normal range of motion





LUNGS: No respiratory distress





Musculoskeletal: Normal range of motion





NEUROLOGICAL:  Normal speech 





PSYCH: Normal mood, normal affect.





SKIN: Warm, Dry, normal turgor, no rashes or lesions noted.


TRAVEL OUTSIDE OF THE [place] IN LAST 30 DAYS: No





- Related Data


Allergies/Adverse Reactions: 


 





No Known Allergies Allergy (Verified 07/02/18 13:12)


 











Past Medical History





- Social History


Chew tobacco use (# tins/day): No


Frequency of alcohol use: None


Drug Abuse: None


Family history: CVA





- Past Medical History


Cardiac Medical History: Reports: Hx Hypertension - not taking meds


Pulmonary Medical History: Reports: Hx Bronchitis, Hx COPD


Renal/ Medical History: Denies: Hx Peritoneal Dialysis


Musculoskeltal Medical History: Reports Hx Arthritis


Psychiatric Medical History: Reports: Hx Depression


Past Surgical History: Reports: Hx Gynecologic Surgery - part of cervix, Hx 

Hysterectomy





- Immunizations


Hx Diphtheria, Pertussis, Tetanus Vaccination: Yes





Physical Exam





- Vital signs


Vitals: 





 











Temp Pulse Resp BP Pulse Ox


 


 98.0 F   72   20   154/78 H  99 


 


 07/02/18 12:58  07/02/18 12:58  07/02/18 12:58  07/02/18 12:58  07/02/18 12:58














Course





- Vital Signs


Vital signs: 





 











Temp Pulse Resp BP Pulse Ox


 


 98.0 F   72   20   154/78 H  99 


 


 07/02/18 12:58  07/02/18 12:58  07/02/18 12:58  07/02/18 12:58  07/02/18 12:58

## 2019-01-20 ENCOUNTER — HOSPITAL ENCOUNTER (EMERGENCY)
Dept: HOSPITAL 62 - ER | Age: 59
LOS: 1 days | Discharge: HOME | End: 2019-01-21
Payer: SELF-PAY

## 2019-01-20 DIAGNOSIS — R42: ICD-10-CM

## 2019-01-20 DIAGNOSIS — R07.9: ICD-10-CM

## 2019-01-20 DIAGNOSIS — R51: ICD-10-CM

## 2019-01-20 DIAGNOSIS — I10: ICD-10-CM

## 2019-01-20 DIAGNOSIS — F32.9: ICD-10-CM

## 2019-01-20 DIAGNOSIS — Z63.5: ICD-10-CM

## 2019-01-20 DIAGNOSIS — F41.9: Primary | ICD-10-CM

## 2019-01-20 DIAGNOSIS — J44.9: ICD-10-CM

## 2019-01-20 DIAGNOSIS — R11.0: ICD-10-CM

## 2019-01-20 DIAGNOSIS — F17.210: ICD-10-CM

## 2019-01-20 LAB
ADD MANUAL DIFF: NO
ALBUMIN SERPL-MCNC: 4.7 G/DL (ref 3.5–5)
ALP SERPL-CCNC: 85 U/L (ref 38–126)
ALT SERPL-CCNC: 24 U/L (ref 9–52)
ANION GAP SERPL CALC-SCNC: 10 MMOL/L (ref 5–19)
APAP SERPL-MCNC: < 10 UG/ML (ref 10–30)
APAP SERPL-MCNC: < 10 UG/ML (ref 10–30)
APPEARANCE UR: (no result)
APTT PPP: YELLOW S
AST SERPL-CCNC: 16 U/L (ref 14–36)
BARBITURATES UR QL SCN: NEGATIVE
BASOPHILS # BLD AUTO: 0.1 10^3/UL (ref 0–0.2)
BASOPHILS NFR BLD AUTO: 0.7 % (ref 0–2)
BILIRUB DIRECT SERPL-MCNC: 0.1 MG/DL (ref 0–0.4)
BILIRUB SERPL-MCNC: 1.4 MG/DL (ref 0.2–1.3)
BILIRUB UR QL STRIP: NEGATIVE
BUN SERPL-MCNC: 9 MG/DL (ref 7–20)
CALCIUM: 9.9 MG/DL (ref 8.4–10.2)
CHLORIDE SERPL-SCNC: 105 MMOL/L (ref 98–107)
CK MB SERPL-MCNC: < 0.22 NG/ML (ref ?–4.55)
CK SERPL-CCNC: 33 U/L (ref 30–135)
CO2 SERPL-SCNC: 25 MMOL/L (ref 22–30)
EOSINOPHIL # BLD AUTO: 0 10^3/UL (ref 0–0.6)
EOSINOPHIL NFR BLD AUTO: 0.4 % (ref 0–6)
ERYTHROCYTE [DISTWIDTH] IN BLOOD BY AUTOMATED COUNT: 13 % (ref 11.5–14)
ETHANOL SERPL-MCNC: < 10 MG/DL
GLUCOSE SERPL-MCNC: 86 MG/DL (ref 75–110)
GLUCOSE UR STRIP-MCNC: NEGATIVE MG/DL
HCT VFR BLD CALC: 42.9 % (ref 36–47)
HGB BLD-MCNC: 14.8 G/DL (ref 12–15.5)
KETONES UR STRIP-MCNC: NEGATIVE MG/DL
LYMPHOCYTES # BLD AUTO: 1.8 10^3/UL (ref 0.5–4.7)
LYMPHOCYTES NFR BLD AUTO: 21 % (ref 13–45)
MCH RBC QN AUTO: 32 PG (ref 27–33.4)
MCHC RBC AUTO-ENTMCNC: 34.6 G/DL (ref 32–36)
MCV RBC AUTO: 93 FL (ref 80–97)
METHADONE UR QL SCN: NEGATIVE
MONOCYTES # BLD AUTO: 0.5 10^3/UL (ref 0.1–1.4)
MONOCYTES NFR BLD AUTO: 5.5 % (ref 3–13)
NEUTROPHILS # BLD AUTO: 6.3 10^3/UL (ref 1.7–8.2)
NEUTS SEG NFR BLD AUTO: 72.4 % (ref 42–78)
NITRITE UR QL STRIP: NEGATIVE
PCP UR QL SCN: NEGATIVE
PH UR STRIP: 6 [PH] (ref 5–9)
PLATELET # BLD: 220 10^3/UL (ref 150–450)
POTASSIUM SERPL-SCNC: 3.5 MMOL/L (ref 3.6–5)
PROT SERPL-MCNC: 7.4 G/DL (ref 6.3–8.2)
PROT UR STRIP-MCNC: NEGATIVE MG/DL
RBC # BLD AUTO: 4.64 10^6/UL (ref 3.72–5.28)
SALICYLATES SERPL-MCNC: 2.9 MG/DL (ref 2–20)
SALICYLATES SERPL-MCNC: 4.1 MG/DL (ref 2–20)
SODIUM SERPL-SCNC: 140.3 MMOL/L (ref 137–145)
SP GR UR STRIP: 1
TOTAL CELLS COUNTED % (AUTO): 100 %
TROPONIN I SERPL-MCNC: < 0.012 NG/ML
URINE AMPHETAMINES SCREEN: NEGATIVE
URINE BENZODIAZEPINES SCREEN: NEGATIVE
URINE COCAINE SCREEN: NEGATIVE
URINE MARIJUANA (THC) SCREEN: NEGATIVE
UROBILINOGEN UR-MCNC: NEGATIVE MG/DL (ref ?–2)
WBC # BLD AUTO: 8.7 10^3/UL (ref 4–10.5)

## 2019-01-20 PROCEDURE — 93005 ELECTROCARDIOGRAM TRACING: CPT

## 2019-01-20 PROCEDURE — 36415 COLL VENOUS BLD VENIPUNCTURE: CPT

## 2019-01-20 PROCEDURE — 71045 X-RAY EXAM CHEST 1 VIEW: CPT

## 2019-01-20 PROCEDURE — 80307 DRUG TEST PRSMV CHEM ANLYZR: CPT

## 2019-01-20 PROCEDURE — 84484 ASSAY OF TROPONIN QUANT: CPT

## 2019-01-20 PROCEDURE — 85025 COMPLETE CBC W/AUTO DIFF WBC: CPT

## 2019-01-20 PROCEDURE — 99285 EMERGENCY DEPT VISIT HI MDM: CPT

## 2019-01-20 PROCEDURE — 82550 ASSAY OF CK (CPK): CPT

## 2019-01-20 PROCEDURE — 81001 URINALYSIS AUTO W/SCOPE: CPT

## 2019-01-20 PROCEDURE — 93010 ELECTROCARDIOGRAM REPORT: CPT

## 2019-01-20 PROCEDURE — 82553 CREATINE MB FRACTION: CPT

## 2019-01-20 PROCEDURE — 80053 COMPREHEN METABOLIC PANEL: CPT

## 2019-01-20 SDOH — SOCIAL STABILITY - SOCIAL INSECURITY: DISRUPTION OF FAMILY BY SEPARATION AND DIVORCE: Z63.5

## 2019-01-20 NOTE — EKG REPORT
SEVERITY:- BORDERLINE ECG -

SINUS RHYTHM

BORDERLINE T ABNORMALITIES, ANTERIOR LEADS

:

Confirmed by: Munir Breaux 20-Jan-2019 23:47:45

## 2019-01-20 NOTE — RADIOLOGY REPORT (SQ)
EXAM DESCRIPTION:  CHEST SINGLE VIEW



COMPLETED DATE/TIME:  1/20/2019 5:56 pm



REASON FOR STUDY:  SOB



COMPARISON:  10/9/2016 and earlier



EXAM PARAMETERS:  NUMBER OF VIEWS: One view.

TECHNIQUE: Single frontal radiographic view of the chest acquired.

RADIATION DOSE: NA

LIMITATIONS: None.



FINDINGS:  LUNGS AND PLEURA: No opacities, masses or pneumothorax. No pleural effusion.

MEDIASTINUM AND HILAR STRUCTURES: No masses.  Contour normal.

HEART AND VASCULAR STRUCTURES: Heart normal in size.  Normal vasculature.

BONES: No acute findings.

HARDWARE: None in the chest.

OTHER: No other significant finding.



IMPRESSION:  NO ACUTE RADIOGRAPHIC FINDING IN THE CHEST.



TECHNICAL DOCUMENTATION:  JOB ID:  [id number]

 2011 [employer]- All Rights Reserved



Reading location - IP/workstation name: [email or web address]

## 2019-01-20 NOTE — ER DOCUMENT REPORT
ED Respiratory Problem





- General


Mode of Arrival: Ambulatory


Information source: Patient


TRAVEL OUTSIDE OF THE [place] IN LAST 30 DAYS: No





<GENE ALVAREZ - Last Filed: 01/20/19 19:20>





<MARCO GARRETT - Last Filed: 01/20/19 19:26>





<FERDINAND KIM - Last Filed: 01/21/19 16:12>





<JESSICA MARQUES - Last Filed: 01/21/19 16:26>





- General


Chief Complaint: Anxiety


Stated Complaint: ANXIETY


Time Seen by Provider: 01/20/19 16:56


Primary Care Provider: 


[facility] [Provider Group] - Follow up as needed


Notes: 





58-year-old female who presents to the emergency department today with 

complaints of a 3-day history of a "pounding" headache.  Patient mentions she 

has had increased stress recently and is tearful intermittently throughout the 

exam.  Patient states she has had associated chest pain, nausea, and dizziness. 

Patient mentions that she got  1 year ago and never had any anxiety or d

epression until after the divorce.  Patient states she feels that she has no one

to take care of her and she does not want to stress out her kids.  Patient 

states that this chest pain was present last night but eased off when she was 

with her son. Patient had chest pain again today but it did not start until her 

son dropped her back off at her house.  Patient does have a history of anxiety 

and depression and mentions that she has no insurance now. (GENE ALVAREZ)





- Related Data


Allergies/Adverse Reactions: 


                                        





No Known Allergies Allergy (Verified 07/02/18 13:12)


   











Past Medical History





- General


Information source: Patient





- Social History


Smoking Status: Current Every Day Smoker


Cigarette use (# per day): Yes


Family History: Reviewed & Not Pertinent


Patient has suicidal ideation: No


Patient has homicidal ideation: No





- Past Medical History


Cardiac Medical History: Reports: Hx Hypertension - not taking meds


Pulmonary Medical History: Reports: Hx Bronchitis, Hx COPD


Renal/ Medical History: Denies: Hx Peritoneal Dialysis


Musculoskeletal Medical History: Reports Hx Arthritis


Psychiatric Medical History: Reports: Hx Depression


Past Surgical History: Reports: Hx Gynecologic Surgery - part of cervix, Hx 

Hysterectomy





- Immunizations


Hx Diphtheria, Pertussis, Tetanus Vaccination: Yes





<GENE ALVAREZ - Last Filed: 01/20/19 19:20>





Review of Systems





- Review of Systems


Constitutional: No symptoms reported


EENT: No symptoms reported


Cardiovascular: See HPI, Chest pain, Dizziness


Respiratory: No symptoms reported


Gastrointestinal: See HPI, Nausea


Genitourinary: No symptoms reported


Female Genitourinary: No symptoms reported


Musculoskeletal: No symptoms reported


Skin: No symptoms reported


Hematologic/Lymphatic: No symptoms reported


Neurological/Psychological: See HPI, Headaches


-: Yes All other systems reviewed and negative





<GENE ALVAREZ - Last Filed: 01/20/19 19:20>





Physical Exam





<GENE ALVAREZ - Last Filed: 01/20/19 19:20>





- Vital signs


Vitals: 





                                        











Temp Pulse Resp BP Pulse Ox


 


 98 F   73   24 H  184/89 H  98 


 


 01/20/19 17:01  01/20/19 17:01  01/20/19 17:01  01/20/19 17:01  01/20/19 17:01














- Notes


Notes: 





PHYSICAL EXAM


 


GENERAL: Alert, interacts well. Crying.


HEAD: Normocephalic, atraumatic.


EYES: Pupils equal, round, and reactive to light. Extraocular movements intact.


ENT: Oral mucosa moist, tongue midline. 


NECK: Full range of motion. Supple. Trachea midline.


LUNGS: Clear to auscultation bilaterally, no wheezes, rales, or rhonchi. No 

respiratory distress.


HEART: Regular rate and rhythm. No murmurs, gallops, or rubs.


ABDOMEN: Soft, non-tender. Non-distended. Bowel sounds present in all 4 

quadrants. No guarding, rigidity, or rebound.


EXTREMITIES: Moves all 4 extremities spontaneously. No edema, radial and 

dorsalis pedis pulses 2/4 bilaterally. No cyanosis.


NEUROLOGICAL: Alert and oriented x3. Normal speech. 


PSYCH: Tearful intermittently, appears anxious and depressed.


SKIN: Warm, dry, normal turgor. No rashes or lesions noted.


 (GENE ALVAREZ)





Course





- Laboratory


Result Diagrams: 


                                 01/20/19 17:58





                                 01/20/19 17:58





<GENE ALVAREZ - Last Filed: 01/20/19 19:20>





- Laboratory


Result Diagrams: 


                                 01/20/19 17:58





                                 01/20/19 17:58





<MARCO GARRETT - Last Filed: 01/20/19 19:26>





- Laboratory


Result Diagrams: 


                                 01/20/19 17:58





                                 01/20/19 17:58





<FERDINAND KIM - Last Filed: 01/21/19 16:12>





- Laboratory


Result Diagrams: 


                                 01/20/19 17:58





                                 01/20/19 17:58





<JESSICA MARQUES - Last Filed: 01/21/19 16:26>





- Re-evaluation


Re-evalutation: 





01/20/19 19:26


CBC unremarkable, CMP grossly unremarkable, slightly low potassium at 3.5, 

cardiac enzymes negative, leukocyte esterase is urinalysis shows trace leukocyte

esterase, 3+ bacteria but there is 19 squamous epithelial cells, this is likely 

contaminated, patient is 4.1 salicylates, when questioned about this she states 

she took Excedrin at her son's house however we will recheck a level in 

approximately 4 hours, urine drug screen negative, acetaminophen and alcohol are

undetectable, chest x-ray shows no acute process.





Repeat troponin will also be checked however this patient's intermittent chest 

pain, headache, nausea is much more consistent with anxiety than it is with 

acute coronary syndrome.  Patient will stay overnight and be seen by behavioral 

health in the morning for possible medication adjustment and referral for 

outpatient therapy.  Patient is not involuntarily committed, she is not 

petitioned, she is welcome to leave at any time. (MARCO GARRETT)





- Vital Signs


Vital signs: 





                                        











Temp Pulse Resp BP Pulse Ox


 


 98.2 F   81   22 H  173/83 H  98 


 


 01/21/19 05:00  01/21/19 05:00  01/21/19 05:00  01/21/19 05:00  01/21/19 05:00














- Laboratory


Laboratory results interpreted by me: 





                                        











  01/20/19 01/20/19 01/20/19





  17:45 17:58 21:14


 


Potassium   3.5 L 


 


Total Bilirubin   1.4 H 


 


Ur Leukocyte Esterase  TRACE H  


 


Acetaminophen   < 10 L  < 10 L














- EKG Interpretation by Me


Additional EKG results interpreted by me: 





01/20/19 19:28


EKG shows sinus rhythm at a rate of 68, left axis deviation, normal intervals, 

no ST segment elevations or depressions, there are T wave inversions noted in 

lead III, biphasic T waves in V3 and flattening in V4 per my interpretation. 

(MARCO GARRETT)





Discharge





<GENE ALVAREZ - Last Filed: 01/20/19 19:20>





<MARCO GARRETT - Last Filed: 01/20/19 19:26>





<FERDINAND KIM - Last Filed: 01/21/19 16:12>





<JESSICA MARQUES - Last Filed: 01/21/19 16:26>





- Discharge


Clinical Impression: 


 Anxiety





Depression


Qualifiers:


 Depression Type: unspecified Qualified Code(s): F32.9 - Major depressive 

disorder, single episode, unspecified





Condition: Stable


Disposition: HOME, SELF-CARE


Instructions:  Anxiety ([facility])


Additional Instructions: 


You have been evaluated and assessed at [facility] Emergency Department by both the 

medical and behavioral health teams after presenting for  anxiety and are now 

deemed appropriate for discharge.  While in the ED, you received an initial 

medical screening, lab work, EKG, medications, direct staff observation, 

clinical evaluation, physician assessment, and outpatient resources.  You were 

cleared from both services and 


You are encouraged to develop coping skills through counseling and are 

encouraged to follow up with a mental health care provider for medication and 

therapy.  Resources were provided to you for local mental health providers and 

mobile crisis services.  Please maintain compliance with your prescribed 

medication.








Anxiety





     The physician feels that some of your health problems are being caused by 

anxiety.  Anxiety affects your health in many ways.  Anxiety alone can cause 

palpitations, sweats, chest pains, abdominal pains, shortness of breath, and 

headaches.  It contributes to ulcer disease, high blood pressure, irritable 

bowel syndrome, and has been shown to cause flare-ups of many other diseases.


     Anxiety is not a simple disorder to treat.  If the anxiety is due to recent

life stresses, you may simply need time to "work through" the changes.  If the 

anxiety is due to an underlying unhappiness with yourself or due to psychiatric 

disturbance, professional help will be needed.  Your physician can refer you for

further help if needed.


     Anti-anxiety medication is occasionally given if the stress is acute or if 

you are having trouble sleeping.  Chronic or frequent use of these medications 

is not a good idea because the body becomes reliant on it, preventing you from 

dealing with life's normal stresses.





Depression





     Your evaluation reveals that you have mental depression. While symptoms may

be vague, they often include disturbance of sleep, fatigue, loss of appetite, 

and general loss of interest in life.  While depression may be a side effect of 

drugs, or a reaction to a major change in your life, many cases have no known 

cause.


     If depression is acute, and related to a major loss in your life, you can 

expect it to clear completely with time.  If you have been depressed a long 

time, are prone to repeated bouts of depression or low mood, or have been 

thinking of suicide, get help.


     Depression can be treated with anti-depressant medication and counselling. 

Long-term depression will often take a few weeks to clear, even with appropriate

medication.  Follow-up care is important.


     Contact your physician, the hospital emergency center, crisis line, or your

counsellor if you are losing control or having self-destructive thoughts.





Prescriptions: 


Buspirone HCl [Buspar 5 mg Tablet] 1 tab PO BID #30 tab


Citalopram Hydrobromide [Celexa 20 mg Tablet] 20 mg PO DAILY #15 tablet


Referrals: 


[facility] [Provider Group] - Follow up as needed





Scribe Documentation





- Scribe


Written by Renetta:: Renetta Acuna, 1/20/2019 1905


acting as scribe for :: Bon





<GENE ALVAREZ - Last Filed: 01/20/19 19:20>

## 2019-01-21 VITALS — SYSTOLIC BLOOD PRESSURE: 173 MMHG | DIASTOLIC BLOOD PRESSURE: 83 MMHG
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
## 2019-01-21 NOTE — ER DOCUMENT REPORT
Doctor's Note


Notes: 





01/21/19 09:39


Rounds: Chart reviewed and patient interviewed.  Patient being evaluated for 

depression and anxiety.  When she was admitted, she was tearful and very 

depressed.  Says she does not feel any different today but she did not crying.  

Labs show a slightly low potassium at 3.5.  Patient had aspirin in her system b

ut she took some Excedrin yesterday.  Vital signs have slightly elevated blood 

pressur.  Patient says she does have high blood pressure but not currently on 

any medicines for it.  Patient appears to be medically stable for transfer or 

discharge.


KIN Burrows MD

## 2019-02-07 ENCOUNTER — HOSPITAL ENCOUNTER (OUTPATIENT)
Dept: HOSPITAL 62 - LAB | Age: 59
End: 2019-02-07
Payer: SELF-PAY

## 2019-02-07 DIAGNOSIS — I10: Primary | ICD-10-CM

## 2019-02-07 LAB
ADD MANUAL DIFF: NO
ALBUMIN SERPL-MCNC: 4.3 G/DL (ref 3.5–5)
ALP SERPL-CCNC: 74 U/L (ref 38–126)
ALT SERPL-CCNC: 14 U/L (ref 9–52)
ANION GAP SERPL CALC-SCNC: 9 MMOL/L (ref 5–19)
AST SERPL-CCNC: 14 U/L (ref 14–36)
BASOPHILS # BLD AUTO: 0.1 10^3/UL (ref 0–0.2)
BASOPHILS NFR BLD AUTO: 0.9 % (ref 0–2)
BILIRUB DIRECT SERPL-MCNC: 0.3 MG/DL (ref 0–0.4)
BILIRUB SERPL-MCNC: 1.2 MG/DL (ref 0.2–1.3)
BUN SERPL-MCNC: 10 MG/DL (ref 7–20)
CALCIUM: 9.3 MG/DL (ref 8.4–10.2)
CHLORIDE SERPL-SCNC: 106 MMOL/L (ref 98–107)
CHOLEST SERPL-MCNC: 186.52 MG/DL (ref 0–200)
CO2 SERPL-SCNC: 27 MMOL/L (ref 22–30)
EOSINOPHIL # BLD AUTO: 0.1 10^3/UL (ref 0–0.6)
EOSINOPHIL NFR BLD AUTO: 1.7 % (ref 0–6)
ERYTHROCYTE [DISTWIDTH] IN BLOOD BY AUTOMATED COUNT: 12.8 % (ref 11.5–14)
GLUCOSE SERPL-MCNC: 99 MG/DL (ref 75–110)
HCT VFR BLD CALC: 39.8 % (ref 36–47)
HGB BLD-MCNC: 14.1 G/DL (ref 12–15.5)
LDLC SERPL DIRECT ASSAY-MCNC: 113 MG/DL (ref ?–100)
LYMPHOCYTES # BLD AUTO: 1.9 10^3/UL (ref 0.5–4.7)
LYMPHOCYTES NFR BLD AUTO: 28.9 % (ref 13–45)
MCH RBC QN AUTO: 32.8 PG (ref 27–33.4)
MCHC RBC AUTO-ENTMCNC: 35.4 G/DL (ref 32–36)
MCV RBC AUTO: 93 FL (ref 80–97)
MONOCYTES # BLD AUTO: 0.3 10^3/UL (ref 0.1–1.4)
MONOCYTES NFR BLD AUTO: 5.1 % (ref 3–13)
NEUTROPHILS # BLD AUTO: 4.2 10^3/UL (ref 1.7–8.2)
NEUTS SEG NFR BLD AUTO: 63.4 % (ref 42–78)
PLATELET # BLD: 218 10^3/UL (ref 150–450)
POTASSIUM SERPL-SCNC: 4 MMOL/L (ref 3.6–5)
PROT SERPL-MCNC: 6.9 G/DL (ref 6.3–8.2)
RBC # BLD AUTO: 4.29 10^6/UL (ref 3.72–5.28)
SODIUM SERPL-SCNC: 141.7 MMOL/L (ref 137–145)
TOTAL CELLS COUNTED % (AUTO): 100 %
TRIGL SERPL-MCNC: 104 MG/DL (ref ?–150)
VLDLC SERPL CALC-MCNC: 21 MG/DL (ref 10–31)
WBC # BLD AUTO: 6.6 10^3/UL (ref 4–10.5)

## 2019-02-07 PROCEDURE — 80061 LIPID PANEL: CPT

## 2019-02-07 PROCEDURE — 36415 COLL VENOUS BLD VENIPUNCTURE: CPT

## 2019-02-07 PROCEDURE — 80053 COMPREHEN METABOLIC PANEL: CPT

## 2019-02-07 PROCEDURE — 85025 COMPLETE CBC W/AUTO DIFF WBC: CPT

## 2019-02-07 PROCEDURE — 83036 HEMOGLOBIN GLYCOSYLATED A1C: CPT

## 2019-02-07 PROCEDURE — 84443 ASSAY THYROID STIM HORMONE: CPT

## 2019-08-23 ENCOUNTER — HOSPITAL ENCOUNTER (EMERGENCY)
Dept: HOSPITAL 62 - ER | Age: 59
Discharge: HOME | End: 2019-08-23
Payer: SELF-PAY

## 2019-08-23 VITALS — DIASTOLIC BLOOD PRESSURE: 87 MMHG | SYSTOLIC BLOOD PRESSURE: 173 MMHG

## 2019-08-23 DIAGNOSIS — L72.3: Primary | ICD-10-CM

## 2019-08-23 DIAGNOSIS — J44.9: ICD-10-CM

## 2019-08-23 PROCEDURE — 99282 EMERGENCY DEPT VISIT SF MDM: CPT

## 2019-08-23 PROCEDURE — 0H9CXZZ DRAINAGE OF LEFT UPPER ARM SKIN, EXTERNAL APPROACH: ICD-10-PCS

## 2019-08-23 NOTE — ER DOCUMENT REPORT
HPI





- HPI


Time Seen by Provider: 08/23/19 19:17


Pain Level: 2


Notes: 





Patient is a 59-year-old female presents emergency department chief complaint of

possible abscess to her left shoulder.  Patient reports this is been there for 

at least 6 months.  She reports her daughter has squeezed it several times and 

has gotten thick white drainage from it.  She denies any pain at the site, 

denies any fever.  Denies any history of MRSA.








- REPRODUCTIVE


Reproductive: DENIES: Pregnant:





Past Medical History





- General


Information source: Patient





- Social History


Smoking Status: Never Smoker


Frequency of alcohol use: None


Drug Abuse: None


Family History: Reviewed & Not Pertinent





- Past Medical History


Cardiac Medical History: Reports: Hx Hypertension - not taking meds


Pulmonary Medical History: Reports: Hx Bronchitis, Hx COPD


Renal/ Medical History: Denies: Hx Peritoneal Dialysis


Musculoskeletal Medical History: Reports Hx Arthritis


Psychiatric Medical History: Reports: Hx Depression


Past Surgical History: Reports: Hx Gynecologic Surgery - part of cervix, Hx 

Hysterectomy





- Immunizations


Hx Diphtheria, Pertussis, Tetanus Vaccination: Yes





[facility] Provider Document





- CONSTITUTIONAL


Notes: 





PHYSICAL EXAMINATION:





GENERAL: Well-appearing, well-nourished and in no acute distress.





HEAD: Atraumatic, normocephalic.





EYES: Pupils equal round extraocular movements intact,  conjunctiva are normal.





ENT: Nares patent





NECK: Normal range of motion





LUNGS: No respiratory distress





Musculoskeletal: Normal range of motion





NEUROLOGICAL:  Normal speech, normal gait. 





PSYCH: Normal mood, normal affect.





SKIN: 2 areas of induration on left shoulder consistent with sebaceous cyst.  No

surrounding erythema, or fluctuance.








- INFECTION CONTROL


TRAVEL OUTSIDE OF THE [place] IN LAST 30 DAYS: No





Course





- Re-evaluation


Re-evalutation: 





Per patient's request I did perform an I&D, a large amount of thick white 

substance was obtained from both sites on the left shoulder.  Patient tolerated 

procedure well.  Patient will be discharged home in stable condition with 

instructions to continue watching the area, return for any signs and symptoms of

infection.





The patient's emergency department workup and current diagnosis were explained 

to the patient and or family.  Follow-up instructions were provided.  

Medications if prescribed were discussed. Instructions for when to return to the

emergency department including specific worrisome symptoms were discussed with 

the patient and/or family.








- Vital Signs


Vital signs: 


                                        











Temp Pulse Resp BP Pulse Ox


 


 98.2 F   73      173/87 H  97 


 


 08/23/19 17:29  08/23/19 17:29     08/23/19 17:29  08/23/19 17:29














Procedures





- Incision and Drainage


  ** Left shoulder


Type: Simple


Anesthetic type: 1% Lidocaine


Blade size: 11


I&D procedure: Betadine prep applied


Incision Method: Incision made by scalpel





Discharge





- Discharge


Clinical Impression: 


 Sebaceous cyst





Condition: Stable


Disposition: HOME, SELF-CARE


Additional Instructions: 


Please continue to apply warm compresses to the area.  You do not need to be on 

any antibiotics as I do not believe there is any underlying infection.  Please 

follow-up with the [facility] or the [facility] community clinic.  Return

to the emergency department for any new or worsening symptoms.





Referrals: 


[facility] [Provider Group] - Follow up as needed

## 2019-08-24 ENCOUNTER — HOSPITAL ENCOUNTER (EMERGENCY)
Dept: HOSPITAL 62 - ER | Age: 59
Discharge: HOME | End: 2019-08-24
Payer: SELF-PAY

## 2019-08-24 VITALS — SYSTOLIC BLOOD PRESSURE: 154 MMHG | DIASTOLIC BLOOD PRESSURE: 67 MMHG

## 2019-08-24 DIAGNOSIS — F17.210: ICD-10-CM

## 2019-08-24 DIAGNOSIS — Z90.710: ICD-10-CM

## 2019-08-24 DIAGNOSIS — R11.2: ICD-10-CM

## 2019-08-24 DIAGNOSIS — R42: ICD-10-CM

## 2019-08-24 DIAGNOSIS — R51: Primary | ICD-10-CM

## 2019-08-24 PROCEDURE — 96375 TX/PRO/DX INJ NEW DRUG ADDON: CPT

## 2019-08-24 PROCEDURE — 99283 EMERGENCY DEPT VISIT LOW MDM: CPT

## 2019-08-24 PROCEDURE — 96374 THER/PROPH/DIAG INJ IV PUSH: CPT

## 2019-08-24 PROCEDURE — 96361 HYDRATE IV INFUSION ADD-ON: CPT

## 2019-08-24 PROCEDURE — 99406 BEHAV CHNG SMOKING 3-10 MIN: CPT

## 2019-08-24 NOTE — ER DOCUMENT REPORT
HPI





- HPI


Patient complains to provider of: Headache nausea and vomiting after taking 

Percocet yesterday


Time Seen by Provider: 08/24/19 14:20


Onset: Yesterday


Onset/Duration: Persistent


Quality of pain: Achy


Severity: Moderate


Pain Level: 4


Associated Symptoms: Headache, Nausea, Vomiting


Exacerbated by: Denies


Relieved by: Denies


Similar symptoms previously: Yes


Recently seen / treated by doctor: Yes





- ROS


ROS below otherwise negative: Yes





- CONSTITUTIONAL


Constitutional: DENIES: Fever, Chills





- EENT


EENT: DENIES: Sore Throat, Ear Pain, Nasal Drainage-Clear, Nasal Drainage-

Purulent, Congestion, Eye problems





- NEURO


Neurology: REPORTS: Headache, Dizzinesss / Vertigo.  DENIES: Weakness, Vision 

blurred





- CARDIOVASCULAR


Cardiovascular: DENIES: Chest pain





- RESPIRATORY


Respiratory: DENIES: Trouble Breathing, Coughing





- GASTROINTESTINAL


Gastrointestinal: REPORTS: Nausea, Patient vomiting.  DENIES: Abdominal Pain, 

Diarrhea, Constipation, Black / Bloody Stools





- URINARY


Urinary: DENIES: Dysuria, Urgency, Frequency





- REPRODUCTIVE


Reproductive: DENIES: Pregnant:, Postmenopausal, Abnormal bleeding / discharge





- MUSCULOSKELETAL


Musculoskeletal: DENIES: Extremity pain, Back Pain, Neck Pain, Swelling





- DERM


Skin Color: Normal, Pink


Skin Problems: None





Past Medical History





- Social History


Smoking Status: Current Every Day Smoker


Cigarette use (# per day): Yes - 1/2 ppd


Smoking Education Provided: Yes - 4 min


Family History: Reviewed & Not Pertinent


Patient has suicidal ideation: No


Patient has homicidal ideation: No





- Past Medical History


Cardiac Medical History: Reports: Hx Hypertension - not taking meds


Pulmonary Medical History: Reports: Hx Bronchitis, Hx COPD


EENT Medical History: Reports: None


Neurological Medical History: Reports: None


Endocrine Medical History: Reports: None


Renal/ Medical History: Reports: None


Malignancy Medical History: Reports: None


GI Medical History: Reports: None


Musculoskeletal Medical History: Reports Hx Arthritis


Skin Medical History: Reports Hx Cellulitis


Psychiatric Medical History: Reports: Hx Depression


Traumatic Medical History: Reports: None


Infectious Medical History: Reports: None


Past Surgical History: Reports: Hx Gynecologic Surgery - part of cervix, Hx 

Hysterectomy





- Immunizations


Hx Diphtheria, Pertussis, Tetanus Vaccination: Yes





Vertical Provider Document





- CONSTITUTIONAL


Agree With Documented VS: Yes


Exam Limitations: No Limitations, Clinical Condition





- INFECTION CONTROL


TRAVEL OUTSIDE OF THE [place] IN LAST 30 DAYS: No





- HEENT


HEENT: Atraumatic, Normal ENT Exam, Normocephalic, PERRLA





- NECK


Neck: Normal Inspection, Supple, Thyroid Normal





- RESPIRATORY


Respiratory: Breath Sounds Normal, No Respiratory Distress, Chest Non-Tender





- CARDIOVASCULAR


Cardiovascular: Regular Rate, Regular Rhythm, No Murmur





- GI/ABDOMEN


Gastrointestinal: Abdomen Soft, Abdomen Non-Tender, No Organomegaly, Normal 

Bowel Sounds





- REPRODUCTIVE


Female Genitalia: Normal Inspection





- BACK


Back: Normal Inspection





- NEURO


Level of Consciousness: Awake, Alert, Appropriate


Deep Tendon Reflexes: 2+





- DERM


Integumentary: Warm, Dry, No Rash





Course





- Re-evaluation


Re-evalutation: 





08/24/19 20:39


After performing a Medical Screening Examination, I estimate there is LOW risk 

for ACUTE GLAUCOMA, TEMPORAL ARTERITIS, MENINGITIS, INCRANIAL HEMORRHAGE, or 

ISCHEMIC STROKE thus I consider the discharge disposition reasonable.  I have 

reevaluated this patient multiple times and no significant life threatening 

changes are noted. The patient and I have discussed the diagnosis and risks, and

we agree with discharging home with close follow-up with the understanding that 

symptoms and presentations can change. We also discussed returning to the 

Emergency Department immediately if new or worsening symptoms occur. We have 

discussed the symptoms which are most concerning (e.g., changing or worsening 

symptoms, new numbness or weakness, vomiting, fever) that necessitate immediate 

return.





- Vital Signs


Vital signs: 


                                        











Temp Pulse Resp BP Pulse Ox


 


 98.1 F   75   16   168/84 H  98 


 


 08/24/19 13:55  08/24/19 13:55  08/24/19 13:55  08/24/19 13:55  08/24/19 13:55














Discharge





- Discharge


Clinical Impression: 


Headache


Qualifiers:


 Headache type: unspecified Headache chronicity pattern: acute headache Int

ractability: not intractable Qualified Code(s): R51 - Headache





Nausea & vomiting


Qualifiers:


 Vomiting type: unspecified Vomiting Intractability: non-intractable Qualified 

Code(s): R11.2 - Nausea with vomiting, unspecified





Condition: Stable


Disposition: HOME, SELF-CARE


Instructions:  Family Physicians / Practices


Additional Instructions: 


HEADACHE:





     The physician does not feel that the headache you are experiencing has a 

serious underlying cause.  Most headaches are due to emotional stress, with 

resultant muscle tension (tension headache). Occasionally, headaches are 

secondary to changes in the blood vessels of the scalp (vascular headache and 

migraine headache).  Sometimes, a headache is the first symptom of another 

developing illness, such as a viral infection.  You have no evidence of stroke, 

bleeding, meningitis, or other serious cause of your headache.


     The treatment of headaches varies with the severity and cause of the pain. 

Not all headaches need pain shots.  In fact, there is evidence that using 

narcotics for headaches may make them worse in the long run.  The physician will

determine the therapy that's in your best interest.


     If you develop a fever, if the headache is different from any you've 

previously experienced, or if the headache progressively worsens, then call your

physician at once or go to the emergency room.








USE OF DIPHENHYDRAMINE:


     Diphenhydramine (Benadryl) is an antihistamine and has been recommended to 

help treat your headache and to prevent side effects of other medications used 

to treat headaches.  The medication can be repeated four times daily.


     Age         Elixir (12.5 mg/tsp)         25 mg pill


     adult                                     1-2 tabs


     Antihistamines may cause drowsiness, especially with the first dose.  Do 

not operate machinery or drive while under the effects of the medication.  Do 

not combine the medication with alcohol, or with any other medication without 

talking to your doctor.








[place] MEDICATION:


     You have been given a medication to suppress nausea and vomiting. This type

of medication can be given as a shot, pill, or suppository. It will usually last

for many hours.  Pills and shots usually last six to eight hours, suppositories 

last about 12 hours.  For the typical illness, only one or two doses of the 

medication may be necessary.


     Mild lightheadedness may occur.  This type of medicine can cause 

drowsiness.  Do not drive or operate dangerous machinery while under its 

influence.  Do not mix with alcohol.


     See your doctor at once if you have muscle spasms or tightness, or 

uncontrollable motions (particularly of the neck, mouth, or jaw). Persistent 

vomiting or severe lightheadedness should also be evaluated by the physician.








INTRAVENOUS COMPAZINE FOR HEADACHE:


     You have received therapy for headaches, using intravenous Compazine.  This

treatment is dramatically successful in relieving the headache in about 50 

percent of cases.  When it works, it provides a rapid method of eliminating the 

headache without resorting to narcotics (and the problems associated with them).


     Most patients still feel fully alert after the Compazine, but others may be

slightly drowsy.  It's best not to drive or work with machinery for six to eight

hours.  Do not take alcohol or other medication unless you discuss it with the 

doctor.


     If you develop tightness and spasms in your muscles, especially the neck 

and tongue, you should return.  This is a side effect which can be treated.








TORADOL INJECTION:


     You have been given an injection of ketorolac tromethamine (Toradol).  This

is an excellent, safe drug for pain control.  It also has potent 

antiinflammatory action.  You should have significant pain relief within about 

one hour.


     Toradol is not addicting and is non-sedating.  It does not interfere with 

driving or work.


     Call or return if you develop itching, hives, shortness of breath, or rash.





Intravenous (IV) Fluids





     As part of your care today, you received intravenous (IV) fluids.  IV 

fluids are administered to patients who are dehydrated or to those who have 

certain chemical (electrolyte) abnormalities that need correcting.








FOLLOW-UP CARE:


If you have been referred to a physician for follow-up care, call the 

physicians office for an appointment as you were instructed or within the next 

two days.  If you experience worsening or a significant change in your symptoms,

notify the physician immediately or return to the Emergency Department at any 

time for re-evaluation.


Prescriptions: 


Prochlorperazine Maleate [Compazine] 10 mg PO Q6HP PRN #14 tablet


 PRN Reason: 


Forms:  Smoking Cessation Education

## 2019-10-26 ENCOUNTER — HOSPITAL ENCOUNTER (EMERGENCY)
Dept: HOSPITAL 62 - ER | Age: 59
Discharge: HOME | End: 2019-10-26
Payer: SELF-PAY

## 2019-10-26 VITALS — DIASTOLIC BLOOD PRESSURE: 76 MMHG | SYSTOLIC BLOOD PRESSURE: 166 MMHG

## 2019-10-26 DIAGNOSIS — I10: ICD-10-CM

## 2019-10-26 DIAGNOSIS — F17.200: ICD-10-CM

## 2019-10-26 DIAGNOSIS — J44.9: ICD-10-CM

## 2019-10-26 DIAGNOSIS — R45.851: Primary | ICD-10-CM

## 2019-10-26 LAB
ADD MANUAL DIFF: NO
ALBUMIN SERPL-MCNC: 4.4 G/DL (ref 3.5–5)
ALP SERPL-CCNC: 77 U/L (ref 38–126)
ANION GAP SERPL CALC-SCNC: 10 MMOL/L (ref 5–19)
APAP SERPL-MCNC: < 10 UG/ML (ref 10–30)
APPEARANCE UR: (no result)
APTT PPP: (no result) S
AST SERPL-CCNC: 18 U/L (ref 14–36)
BARBITURATES UR QL SCN: NEGATIVE
BASOPHILS # BLD AUTO: 0 10^3/UL (ref 0–0.2)
BASOPHILS NFR BLD AUTO: 0.6 % (ref 0–2)
BILIRUB DIRECT SERPL-MCNC: 0 MG/DL (ref 0–0.4)
BILIRUB SERPL-MCNC: 1.5 MG/DL (ref 0.2–1.3)
BILIRUB UR QL STRIP: NEGATIVE
BUN SERPL-MCNC: 12 MG/DL (ref 7–20)
CALCIUM: 9.6 MG/DL (ref 8.4–10.2)
CHLORIDE SERPL-SCNC: 103 MMOL/L (ref 98–107)
CO2 SERPL-SCNC: 28 MMOL/L (ref 22–30)
EOSINOPHIL # BLD AUTO: 0.1 10^3/UL (ref 0–0.6)
EOSINOPHIL NFR BLD AUTO: 0.6 % (ref 0–6)
ERYTHROCYTE [DISTWIDTH] IN BLOOD BY AUTOMATED COUNT: 13 % (ref 11.5–14)
ETHANOL SERPL-MCNC: < 10 MG/DL
GLUCOSE SERPL-MCNC: 107 MG/DL (ref 75–110)
GLUCOSE UR STRIP-MCNC: NEGATIVE MG/DL
HCT VFR BLD CALC: 41.1 % (ref 36–47)
HGB BLD-MCNC: 13.9 G/DL (ref 12–15.5)
KETONES UR STRIP-MCNC: NEGATIVE MG/DL
LYMPHOCYTES # BLD AUTO: 1.9 10^3/UL (ref 0.5–4.7)
LYMPHOCYTES NFR BLD AUTO: 23 % (ref 13–45)
MCH RBC QN AUTO: 31.4 PG (ref 27–33.4)
MCHC RBC AUTO-ENTMCNC: 33.9 G/DL (ref 32–36)
MCV RBC AUTO: 93 FL (ref 80–97)
METHADONE UR QL SCN: NEGATIVE
MONOCYTES # BLD AUTO: 0.4 10^3/UL (ref 0.1–1.4)
MONOCYTES NFR BLD AUTO: 4.4 % (ref 3–13)
NEUTROPHILS # BLD AUTO: 5.9 10^3/UL (ref 1.7–8.2)
NEUTS SEG NFR BLD AUTO: 71.4 % (ref 42–78)
NITRITE UR QL STRIP: NEGATIVE
PCP UR QL SCN: NEGATIVE
PH UR STRIP: 6 [PH] (ref 5–9)
PLATELET # BLD: 204 10^3/UL (ref 150–450)
POTASSIUM SERPL-SCNC: 3.7 MMOL/L (ref 3.6–5)
PROT SERPL-MCNC: 7.6 G/DL (ref 6.3–8.2)
PROT UR STRIP-MCNC: 30 MG/DL
RBC # BLD AUTO: 4.43 10^6/UL (ref 3.72–5.28)
SALICYLATES SERPL-MCNC: < 1 MG/DL (ref 2–20)
SP GR UR STRIP: 1.01
TOTAL CELLS COUNTED % (AUTO): 100 %
URINE AMPHETAMINES SCREEN: NEGATIVE
URINE BENZODIAZEPINES SCREEN: NEGATIVE
URINE COCAINE SCREEN: NEGATIVE
URINE MARIJUANA (THC) SCREEN: NEGATIVE
UROBILINOGEN UR-MCNC: 2 MG/DL (ref ?–2)
WBC # BLD AUTO: 8.3 10^3/UL (ref 4–10.5)

## 2019-10-26 PROCEDURE — 80053 COMPREHEN METABOLIC PANEL: CPT

## 2019-10-26 PROCEDURE — 99285 EMERGENCY DEPT VISIT HI MDM: CPT

## 2019-10-26 PROCEDURE — 85025 COMPLETE CBC W/AUTO DIFF WBC: CPT

## 2019-10-26 PROCEDURE — 93005 ELECTROCARDIOGRAM TRACING: CPT

## 2019-10-26 PROCEDURE — 80307 DRUG TEST PRSMV CHEM ANLYZR: CPT

## 2019-10-26 PROCEDURE — 36415 COLL VENOUS BLD VENIPUNCTURE: CPT

## 2019-10-26 PROCEDURE — 93010 ELECTROCARDIOGRAM REPORT: CPT

## 2019-10-26 PROCEDURE — 81001 URINALYSIS AUTO W/SCOPE: CPT
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
## 2020-01-05 ENCOUNTER — HOSPITAL ENCOUNTER (EMERGENCY)
Dept: HOSPITAL 62 - ER | Age: 60
Discharge: HOME | End: 2020-01-05
Payer: SELF-PAY

## 2020-01-05 VITALS — DIASTOLIC BLOOD PRESSURE: 79 MMHG | SYSTOLIC BLOOD PRESSURE: 157 MMHG

## 2020-01-05 DIAGNOSIS — Z90.710: ICD-10-CM

## 2020-01-05 DIAGNOSIS — M79.10: ICD-10-CM

## 2020-01-05 DIAGNOSIS — R05: ICD-10-CM

## 2020-01-05 DIAGNOSIS — I10: ICD-10-CM

## 2020-01-05 DIAGNOSIS — F17.200: ICD-10-CM

## 2020-01-05 DIAGNOSIS — H65.91: Primary | ICD-10-CM

## 2020-01-05 DIAGNOSIS — Z88.6: ICD-10-CM

## 2020-01-05 LAB
A TYPE INFLUENZA AG: NEGATIVE
ADD MANUAL DIFF: NO
ALBUMIN SERPL-MCNC: 4.4 G/DL (ref 3.5–5)
ALP SERPL-CCNC: 71 U/L (ref 38–126)
ANION GAP SERPL CALC-SCNC: 11 MMOL/L (ref 5–19)
AST SERPL-CCNC: 28 U/L (ref 14–36)
B INFLUENZA AG: NEGATIVE
BASOPHILS # BLD AUTO: 0 10^3/UL (ref 0–0.2)
BASOPHILS NFR BLD AUTO: 0.2 % (ref 0–2)
BILIRUB DIRECT SERPL-MCNC: 0.5 MG/DL (ref 0–0.4)
BILIRUB SERPL-MCNC: 1.7 MG/DL (ref 0.2–1.3)
BUN SERPL-MCNC: 15 MG/DL (ref 7–20)
CALCIUM: 9.2 MG/DL (ref 8.4–10.2)
CHLORIDE SERPL-SCNC: 98 MMOL/L (ref 98–107)
CO2 SERPL-SCNC: 29 MMOL/L (ref 22–30)
EOSINOPHIL # BLD AUTO: 0 10^3/UL (ref 0–0.6)
EOSINOPHIL NFR BLD AUTO: 0 % (ref 0–6)
ERYTHROCYTE [DISTWIDTH] IN BLOOD BY AUTOMATED COUNT: 12.9 % (ref 11.5–14)
GLUCOSE SERPL-MCNC: 134 MG/DL (ref 75–110)
HCT VFR BLD CALC: 41.7 % (ref 36–47)
HGB BLD-MCNC: 14.6 G/DL (ref 12–15.5)
LYMPHOCYTES # BLD AUTO: 1.3 10^3/UL (ref 0.5–4.7)
LYMPHOCYTES NFR BLD AUTO: 13.6 % (ref 13–45)
MCH RBC QN AUTO: 32.2 PG (ref 27–33.4)
MCHC RBC AUTO-ENTMCNC: 35 G/DL (ref 32–36)
MCV RBC AUTO: 92 FL (ref 80–97)
MONOCYTES # BLD AUTO: 0.6 10^3/UL (ref 0.1–1.4)
MONOCYTES NFR BLD AUTO: 6.4 % (ref 3–13)
NEUTROPHILS # BLD AUTO: 7.7 10^3/UL (ref 1.7–8.2)
NEUTS SEG NFR BLD AUTO: 79.8 % (ref 42–78)
PLATELET # BLD: 149 10^3/UL (ref 150–450)
POTASSIUM SERPL-SCNC: 3.7 MMOL/L (ref 3.6–5)
PROT SERPL-MCNC: 7.8 G/DL (ref 6.3–8.2)
RBC # BLD AUTO: 4.53 10^6/UL (ref 3.72–5.28)
TOTAL CELLS COUNTED % (AUTO): 100 %
WBC # BLD AUTO: 9.6 10^3/UL (ref 4–10.5)

## 2020-01-05 PROCEDURE — 87804 INFLUENZA ASSAY W/OPTIC: CPT

## 2020-01-05 PROCEDURE — 85025 COMPLETE CBC W/AUTO DIFF WBC: CPT

## 2020-01-05 PROCEDURE — 99283 EMERGENCY DEPT VISIT LOW MDM: CPT

## 2020-01-05 PROCEDURE — 80053 COMPREHEN METABOLIC PANEL: CPT

## 2020-01-05 PROCEDURE — 71046 X-RAY EXAM CHEST 2 VIEWS: CPT

## 2020-01-05 PROCEDURE — 36415 COLL VENOUS BLD VENIPUNCTURE: CPT

## 2020-01-05 NOTE — ER DOCUMENT REPORT
ED Flu Like





- General


Chief Complaint: Flu Symptoms


Stated Complaint: POSSIBLE FLU


Time Seen by Provider: 01/05/20 12:03


Primary Care Provider: 


[facility] [Provider Group] - Follow up as needed


[facility] [Provider Group] - Follow up as needed


Mode of Arrival: Ambulatory


Notes: 





Patient is a 59-year-old female with a history of depression, anxiety and 

hypertension who presents to the emergency department with a chief complaint of 

cough, congestion and body aches.  Patient reports she is currently living at a 

homeless shelter.  Patient reports that she has been sick for about 4 days and 

then no one there will help her.  Patient reports she does smoke about a half a 

pack of cigarettes per day.  Patient reports she has had a productive cough with

yellow sputum.  Patient reports chills without notable fever.  Patient reports 

bilateral ear pain, which is worse on the right.  Patient denies vomiting or 

diarrhea but does state having nausea.  Patient reports her last bowel movement 

was yesterday and normal.  Patient denies chest pain or shortness of breath.  Pa

yannant reports she has not taken any over-the-counter medications for her 

symptoms.


TRAVEL OUTSIDE OF THE [place] IN LAST 30 DAYS: No





- Related Data


Allergies/Adverse Reactions: 


                                        





tramadol Allergy (Verified 10/26/19 15:07)


   











Past Medical History





- General


Information source: Patient





- Social History


Smoking Status: Current Every Day Smoker


Frequency of alcohol use: None


Drug Abuse: None


Lives with: Family


Family History: Reviewed & Not Pertinent


Patient has suicidal ideation: No


Patient has homicidal ideation: No





- Past Medical History


Cardiac Medical History: Reports: Hx Hypertension - not taking meds


Pulmonary Medical History: Reports: Hx Bronchitis, Hx COPD


EENT Medical History: Reports: None


Neurological Medical History: Reports: None


Endocrine Medical History: Reports: None


Renal/ Medical History: Reports: None.  Denies: Hx Peritoneal Dialysis


Malignancy Medical History: Reports: None


GI Medical History: Reports: None


Musculoskeletal Medical History: Reports Hx Arthritis


Skin Medical History: Reports Hx Cellulitis


Psychiatric Medical History: Reports: Hx Depression


Traumatic Medical History: Reports: None


Infectious Medical History: Reports: None


Past Surgical History: Reports: Hx Gynecologic Surgery - part of cervix, Hx 

Hysterectomy





- Immunizations


Hx Diphtheria, Pertussis, Tetanus Vaccination: Yes





Review of Systems





- Review of Systems


Constitutional: See HPI


EENT: See HPI


Cardiovascular: No symptoms reported


Respiratory: See HPI


Gastrointestinal: See HPI


Genitourinary: No symptoms reported


Female Genitourinary: No symptoms reported


Musculoskeletal: No symptoms reported


Skin: No symptoms reported


Hematologic/Lymphatic: No symptoms reported


Neurological/Psychological: No symptoms reported





Physical Exam





- Vital signs


Vitals: 


                                        











Temp Pulse Resp BP Pulse Ox


 


 98.8 F   82   16   157/79 H  93 


 


 01/05/20 11:33  01/05/20 11:33  01/05/20 11:33  01/05/20 11:33  01/05/20 11:33











Interpretation: Hypertensive





- Notes


Notes: 





GENERAL: Well-appearing, well-nourished and in no acute distress.


HEAD: Atraumatic, normocephalic.


EYES: Pupils equal round and reactive to light, extraocular movements intact, 

sclera anicteric, conjunctiva are normal.


ENT: Left ear; left external ear unremarkable without edema, erythema.  There is

no mastoid or tragus tenderness.  TM easily visualized which is pearly gray 

without exudate, bulging or erythema.  Right ear; right external ear 

unremarkable without edema or erythema.  Patient does not have mastoid or tragus

tenderness.  Patient's TM on the right is bulging, extremely erythematous with 

effusion, landmarks are distorted.  Nares patent, + rhinorrhea, bilateral 

erythematous turbinates, oropharynx clear without exudates.  Moist mucous 

membranes.


NECK: Normal range of motion, supple without lymphadenopathy or JVD.


LUNGS: Breath sounds clear to auscultation bilaterally and equal.  No wheezes 

rales or rhonchi.  Patient does have intermittent productive cough during 

examination.


HEART: Regular rate and rhythm without murmurs, rubs or gallops.


ABDOMEN: Soft, nontender, normoactive bowel sounds.  No guarding, no rebound.  

No masses appreciated.


BACK: No cervical, thoracic, lumbar midline tenderness.  No saddle anesthesia, 

normal distal neurovascular exam.


GENITOURINARY: Deferred.


EXTREMITIES: Normal range of motion, no pitting or edema.  No clubbing or 

cyanosis.


NEUROLOGICAL: Cranial nerves II through XII grossly intact.  Normal speech, 

normal gait.


PSYCH: Normal mood, normal affect.


SKIN: Warm, Dry, normal turgor, no rashes or lesions noted.





Course





- Re-evaluation


Re-evalutation: 





01/05/20 16:08


Upon initial assessment patient is resting comfortably on stretcher.  Patient is

sleeping and easy to arouse.  Examination does reveal a right otitis media with 

effusion.  Patient verbally upset stating that I am not doing anything to help 

with her symptoms.  I did inform her we will give her first dose of antibiotics 

here in the emergency department and that she does have an ear infection as well

as a upper respiratory infection.  I did explain to the patient that she does 

not have influenza.  Patient reports she does not understand because she has all

the symptoms.  I did inform her that she does have a upper respiratory infection

which is typically due to a virus.  I did educate her that upper respiratory 

infections and viruses can make people feel similar with body aches, chills, 

low-grade fever.  We will give the patient Tessalon Perles, antibiotics for her 

ear infection.  Patient's reports that she is taking amoxicillin and other 

penicillins in the past without any side effects.  This was initially listed on 

her allergy.  This will be removed and corrected.  Patient no acute distress and

nontoxic-appearing


01/05/20 16:15


Patient has concerns about filling her amoxicillin due to financial issues.  Did

provide her with a discount card and per the website it states that her to be 

around $12 at [employer].  I did make her aware of this.  I did inform her that out

of the Tessalon Perles and the antibiotics, the antibiotics are the most 

important because this is used to treat her right ear infection.  Patient 

verbalized understanding.


01/05/20 16:19


Prior to discharge patient's oxygen level 92%.  Patient sitting on the side of 

the bed in no acute distress, breathing is even and unlabored.  Patient does 

have intermittent productive cough.  Did offer the patient a breathing treatment

but patient reports she cannot stay for this as she does have to get back to the

homeless shelter and her only ride is her sister.  Patient is nontoxic-

appearing.





- Vital Signs


Vital signs: 


                                        











Temp Pulse Resp BP Pulse Ox


 


 98.8 F   82   16   157/79 H  93 


 


 01/05/20 11:33  01/05/20 11:33  01/05/20 11:33  01/05/20 11:33  01/05/20 11:33














- Laboratory


Result Diagrams: 


                                 01/05/20 12:28





                                 01/05/20 12:28


Laboratory results interpreted by me: 


                                        











  01/05/20 01/05/20





  12:28 12:28


 


Plt Count  149 L 


 


Seg Neutrophils %  79.8 H 


 


Glucose   134 H


 


Total Bilirubin   1.7 H


 


Direct Bilirubin   0.5 H











01/05/20 16:08


Patient's lab work does not show significant leukocytosis or anemia.  There is 

no alteration in electrolytes.  Bilirubin slightly elevated.  Patient has normal

AST and ALT negative influenza.


Laboratory











  01/05/20 01/05/20 01/05/20





  12:28 12:28 12:28


 


WBC  9.6  


 


RBC  4.53  


 


Hgb  14.6  


 


Hct  41.7  


 


MCV  92  


 


MCH  32.2  


 


MCHC  35.0  


 


RDW  12.9  


 


Plt Count  149 L  


 


Lymph % (Auto)  13.6  


 


Mono % (Auto)  6.4  


 


Eos % (Auto)  0.0  


 


Baso % (Auto)  0.2  


 


Absolute Neuts (auto)  7.7  


 


Absolute Lymphs (auto)  1.3  


 


Absolute Monos (auto)  0.6  


 


Absolute Eos (auto)  0.0  


 


Absolute Basos (auto)  0.0  


 


Seg Neutrophils %  79.8 H  


 


Sodium   137.5 


 


Potassium   3.7 


 


Chloride   98 


 


Carbon Dioxide   29 


 


Anion Gap   11 


 


BUN   15 


 


Creatinine   0.63 


 


Est GFR ( Amer)   > 60 


 


Est GFR (MDRD) Non-Af   > 60 


 


Glucose   134 H 


 


Calcium   9.2 


 


Total Bilirubin   1.7 H 


 


Direct Bilirubin   0.5 H 


 


Neonat Total Bilirubin   Not Reportable 


 


Neonat Direct Bilirubin   Not Reportable 


 


Neonat Indirect Bili   Not Reportable 


 


AST   28 


 


ALT   16 


 


Alkaline Phosphatase   71 


 


Total Protein   7.8 


 


Albumin   4.4 


 


Influenza A (Rapid)    NEGATIVE


 


Influenza B (Rapid)    NEGATIVE














- Diagnostic Test


Radiology reviewed: Reports reviewed


Radiology results interpreted by me: 





01/05/20 16:08





                                        





Chest X-Ray  01/05/20 12:18


IMPRESSION:  NO ACUTE RADIOGRAPHIC FINDING IN THE CHEST.


 














Discharge





- Discharge


Clinical Impression: 


 Myalgia, Generalized body aches, Productive cough, Right otitis media with 

effusion





Condition: Stable


Disposition: HOME, SELF-CARE


Additional Instructions: 


*Today was seen in the emergency department for cough, ear pain, congestion and 

body aches.  Your symptoms are consistent with an upper respiratory illness this

also called the common cold.  With this you can have all of the symptoms plus a 

fever.  This is highly contagious.  This usually lasts about 10 to 14 days.  

Antibiotics are not used for this.  Please make sure you are drinking plenty of 

fluids.  You can use an over-the-counter Mucinex to help bring up the thick 

sputum that you are coughing.  Use Tylenol and ibuprofen as needed for fever or 

aches.





*You are being prescribed an antibiotic called amoxicillin for your ear 

infection on the right side.  Your ear should start to feel better over the next

few days.





*For cough and given you a prescription for Tessalon Perles.  I am also giving 

you a albuterol inhaler to go home with.  Please use this 2 puffs every 4 hours 

as needed for wheezing.  Please quit smoking as this can make your symptoms 

significantly worse.








Otitis Media





     You have a middle ear infection (otitis media).  This is usually a 

complication of a cold or sore throat.  The middle ear cavity becomes filled 

with infection.  Pressure and stretching of the ear drum cause pain.


     Antibiotics are required.  A 10 day course is usually prescribed. A 

decongestant may be recommended if you have a "runny nose."  You may need 

anesthetic drops or other pain medication.


     A follow-up exam may be recommended to make sure the infection has 

completely cleared.


     If the ear begins to drain, it means the ear drum has ruptured. This will 

usually heal spontaneously.  However, it means you should keep the ear dry until

re-examined by a doctor.


     Call the physician or return for examination at once if there is severe 

headache, stiff neck, confusion, increasing fever, or dizziness. You should 

improve significantly within two days.  If you're not better, call the doctor.











UPPER RESPIRATORY ILLNESS:





     You have a viral infection of the respiratory passages -- a "cold."  This 

common infection causes nasal congestion, drainage, and often sore throat and 

cough.  It is highly contagious.  The disease usually lasts about 10 to 14 days.


     There is no "cure" for the viral infection -- it must run its course.  If 

there is a complication, such as bacterial infection in the nose, sinuses, 

middle ear, or bronchial tubes, antibiotics may be required.  The antibiotics 

won't affect the virus.


     Drink plenty of fluids.  A humidifier may help.  An expectorant medication 

or decongestant may make you more comfortable.  Use acetaminophen or ibuprofen 

for fever or aches.


     See the doctor if fever persists over two days, if there is any significant

worsening of your symptoms, or if you simply fail to improve as expected.











COUGH-SUPPRESSANT & EXPECTORANT MEDICATION:


     You are to use a cough medication as needed for relief of symptoms.  This 

medicine is a combination of an expectorant (to make the mucous thinner and more

easily "coughed up") and a cough suppressant (to reduce the frequency of 

coughing).


     The cough-suppressant medicine is related to narcotics.  You may experience

mild nausea and sleepiness.  Some patients who are very sensitive to narcotics 

may have stomach pain from this medicine. Taking the medicine with food reduces 

these side effects.  Do not drive or work with machinery until you know how this

medicine affects you.


     The expectorant should have no side effects.  Iodine-containing 

expectorants (such as organidin) should not be taken by persons with active 

thyroid disease unless approved by your doctor.


     Call the doctor if you develop shortness of breath, hives, rash, itching, 

lightheadedness, or severe nausea and vomiting.








INHALED BRONCHODILATORS:


     You have received a treatment of and/or prescription for an inhaled 

bronchodilator -- a medication which stimulates the airways in the lung to 

dilate.  This improves the flow of air in asthma, bronchitis, and emphysema.


     These medicines have some similarity to adrenaline, and can cause similar 

side effects:  shakiness, racing heart, and a sense of nervousness.  These side 

effects decrease with time.  Contact your doctor if these side effects are 

severe.


     Do not over-use the medicine.  Too-frequent use of the inhaler may make it 

ineffective.  Call your doctor if the inhaler is not controlling your symptoms 

at the prescribed doses.








USE OF ACETAMINOPHEN (Tylenol):


     Acetaminophen may be taken for pain relief or fever control. It's much 

safer than aspirin, offering a wider range of "safe" dosages.  It is safe during

pregnancy.  Some brand names are Tylenol, Panadol, Datril, Anacin 3, Tempra, and

Liquiprin. Acetaminophen can be repeated every four hours.  The following are 

maximum recommended dosages:


>89 pounds or adults          650 mg to 900 mg


Acetaminophen can be repeated every four hours.  Maximum dose not to exceed 4000

mg a day.








SMOKING:


     If you smoke, you should stop smoking.  The tar and chemicals in cigarette 

smoke are harmful.  Smoking has been shown to cause:


          emphysema


          chronic bronchitis


          lung cancer


          mouth and throat cancer


          stomach and pancreas cancer


          premature aging


          birth defects


     In addition, smoking increases ear and lung infections in children of 

smokers.








FOLLOW-UP CARE:


If you have been referred to a physician for follow-up care, call the 

physicians office for an appointment as you were instructed or within the next 

two days.  If you experience worsening or a significant change in your symptoms,

notify the physician immediately or return to the Emergency Department at any 

time for re-evaluation.





Prescriptions: 


Benzonatate [Tessalon Perles 100 mg Capsule] 100 mg PO Q8HP PRN #40 capsule


 PRN Reason: 


Amoxicillin 2 tab PO TID 7 Days #42 tab


Forms:  Smoking Cessation Education


Referrals: 


[facility] [Provider Group] - Follow up as needed


[facility] [Provider Group[facility] - Follow up as needed

## 2020-01-05 NOTE — RADIOLOGY REPORT (SQ)
EXAM DESCRIPTION:  CHEST 2 VIEWS



COMPLETED DATE/TIME:  1/5/2020 12:38 pm



REASON FOR STUDY:  cough/fever



COMPARISON:  Chest films 1/8/2016, 1/9/2016, 1/20/2019



EXAM PARAMETERS:  NUMBER OF VIEWS: two views

TECHNIQUE: Digital Frontal and Lateral radiographic views of the chest acquired.

RADIATION DOSE: NA

LIMITATIONS: none



FINDINGS:  LUNGS AND PLEURA: No opacities, masses or pneumothorax. No pleural effusion.

MEDIASTINUM AND HILAR STRUCTURES: No masses or contour abnormalities.

HEART AND VASCULAR STRUCTURES: Heart normal size.  No evidence for failure.

BONES: No acute findings.

HARDWARE: None in the chest.

OTHER: No other significant finding.



IMPRESSION:  NO ACUTE RADIOGRAPHIC FINDING IN THE CHEST.



TECHNICAL DOCUMENTATION:  JOB ID:  [id number]

 2011 [employer]- All Rights Reserved



Reading location - IP/workstation name: [email or web address]

## 2020-01-05 NOTE — ER DOCUMENT REPORT
ED Medical Screen (RME)





- General


Chief Complaint: Flu Symptoms


Stated Complaint: POSSIBLE FLU


Time Seen by Provider: 01/05/20 12:03


Mode of Arrival: Ambulatory


Information source: Patient


Notes: 





59-year-old female patient presents emergency department with cough, congestion,

fever and body aches.  She states she has been staying at the shelter and nobody

will help her.  She has not taken any medications for her symptoms.  Her 

symptoms have been ongoing for 3 days.





Lung sounds clear and equal bilaterally.





I have greeted and performed a rapid initial assessment of this patient.  A 

comprehensive ED assessment and evaluation of the patient, analysis of test 

results and completion of the medical decision making process will be conducted 

by additional ED providers.  I have specifically instructed the patient or 

family members with the patient to immediately return to any nursing staff 

should anything change in the patient's condition or with their chief complaint.








TRAVEL OUTSIDE OF THE [place] IN LAST 30 DAYS: No





- Related Data


Allergies/Adverse Reactions: 


                                        





Penicillins Allergy (Verified 10/26/19 15:07)


   


tramadol Allergy (Verified 10/26/19 15:07)


   











Past Medical History





- Social History


Frequency of alcohol use: None


Drug Abuse: None


Family history: CVA





- Past Medical History


Cardiac Medical History: Reports: Hx Hypertension - not taking meds


Pulmonary Medical History: Reports: Hx Bronchitis, Hx COPD


Renal/ Medical History: Denies: Hx Peritoneal Dialysis


Musculoskeltal Medical History: Reports Hx Arthritis


Skin Medical History: Reports Hx Cellulitis


Psychiatric Medical History: Reports: Hx Depression


Past Surgical History: Reports: Hx Gynecologic Surgery - part of cervix, Hx 

Hysterectomy





- Immunizations


Hx Diphtheria, Pertussis, Tetanus Vaccination: Yes





Physical Exam





- Vital signs


Vitals: 





                                        











Temp Pulse Resp BP Pulse Ox


 


 98.8 F   82   16   157/79 H  93 


 


 01/05/20 11:33  01/05/20 11:33  01/05/20 11:33  01/05/20 11:33  01/05/20 11:33














Course





- Vital Signs


Vital signs: 





                                        











Temp Pulse Resp BP Pulse Ox


 


 98.8 F   82   16   157/79 H  93 


 


 01/05/20 11:33  01/05/20 11:33  01/05/20 11:33  01/05/20 11:33  01/05/20 11:33

## 2020-10-23 ENCOUNTER — HOSPITAL ENCOUNTER (EMERGENCY)
Dept: HOSPITAL 62 - ER | Age: 60
Discharge: HOME | End: 2020-10-23
Payer: SELF-PAY

## 2020-10-23 VITALS — DIASTOLIC BLOOD PRESSURE: 94 MMHG | SYSTOLIC BLOOD PRESSURE: 162 MMHG

## 2020-10-23 DIAGNOSIS — I10: ICD-10-CM

## 2020-10-23 DIAGNOSIS — J44.9: ICD-10-CM

## 2020-10-23 DIAGNOSIS — M79.605: ICD-10-CM

## 2020-10-23 DIAGNOSIS — M23.91: Primary | ICD-10-CM

## 2020-10-23 DIAGNOSIS — Z90.710: ICD-10-CM

## 2020-10-23 DIAGNOSIS — F17.200: ICD-10-CM

## 2020-10-23 PROCEDURE — 93971 EXTREMITY STUDY: CPT

## 2020-10-23 PROCEDURE — 99284 EMERGENCY DEPT VISIT MOD MDM: CPT

## 2020-10-23 NOTE — ER DOCUMENT REPORT
ED Medical Screen (RME)





- General


Stated Complaint: LEFT LEG PAIN


Time Seen by Provider: 10/23/20 16:02


Mode of Arrival: Wheelchair


Information source: Patient


Notes: 





Patient presents complaining of right lower extremity pain for the past week.  

Patient denies any injury.  Patient complains of right knee pain that radiates 

into the right calf.  Patient denies any chest pain or shortness of breath.  

Patient denies any history of gout.





I have greeted and performed a rapid initial assessment of this patient.  A 

comprehensive ED assessment and evaluation of the patient, analysis of test 

results and completion of the medical decision making process will be conducted 

by additional ED providers.


TRAVEL OUTSIDE OF THE [place] IN LAST 30 DAYS: No





- Related Data


Allergies/Adverse Reactions: 


                                        





tramadol Allergy (Verified 10/26/19 15:07)


   











Past Medical History





- Social History


Family history: CVA





- Past Medical History


Cardiac Medical History: Reports: Hx Hypertension - not taking meds


Pulmonary Medical History: Reports: Hx Bronchitis, Hx COPD


Renal/ Medical History: Denies: Hx Peritoneal Dialysis


Musculoskeltal Medical History: Reports Hx Arthritis


Skin Medical History: Reports Hx Cellulitis


Psychiatric Medical History: Reports: Hx Depression


Past Surgical History: Reports: Hx Gynecologic Surgery - part of cervix, Hx 

Hysterectomy





- Immunizations


Hx Diphtheria, Pertussis, Tetanus Vaccination: Yes





Physical Exam





- Vital signs


Vitals: 





                                        











Temp Pulse Resp BP Pulse Ox


 


 97.8 F   86   16   159/96 H  100 


 


 10/23/20 15:58  10/23/20 15:58  10/23/20 15:58  10/23/20 15:58  10/23/20 15:58














- General


General appearance: Alert, Anxious


Notes: 





Patient somewhat tearful in triage, patient with exaggerated pain response with 

very light palpation of the right knee, no obvious erythema to the knee





Course





- Vital Signs


Vital signs: 





                                        











Temp Pulse Resp BP Pulse Ox


 


 97.8 F   86   16   159/96 H  100 


 


 10/23/20 15:58  10/23/20 15:58  10/23/20 15:58  10/23/20 15:58  10/23/20 15:58

## 2020-10-23 NOTE — RADIOLOGY REPORT (SQ)
EXAM DESCRIPTION:  KNEE RIGHT 4 VIEWS



IMAGES COMPLETED DATE/TIME:  10/23/2020 4:29 pm



REASON FOR STUDY:  R knee, RLE pain



COMPARISON:  None.



NUMBER OF VIEWS:  Four views.



TECHNIQUE:  AP, lateral, and both oblique radiographic images acquired of the right knee.



LIMITATIONS:  None.



FINDINGS:  MINERALIZATION: Normal.

BONES: No acute fracture or dislocation.  No worrisome bone lesions.

JOINT: No effusion.

SOFT TISSUES: No soft tissue swelling.  No radio-opaque foreign body.

OTHER: No other significant finding.



IMPRESSION:  NEGATIVE STUDY OF THE RIGHT KNEE. NO RADIOGRAPHIC EVIDENCE OF ACUTE INJURY.



TECHNICAL DOCUMENTATION:  JOB ID:  [id number]

 2011 [employer]- All Rights Reserved



Reading location - IP/workstation name: [email or web address]

## 2020-10-23 NOTE — ER DOCUMENT REPORT
ED General





- General


Chief Complaint: Leg Pain


Stated Complaint: LEFT LEG PAIN


Time Seen by Provider: 10/23/20 16:02


Mode of Arrival: Wheelchair


Information source: Patient


Notes: 





Patient is a 60-year-old female coming in today with right-sided knee pain that 

radiates down the lower part of her leg.  No known injury.  Patient states the 

knee is slightly swollen and hurts to bear weight.  No problems with this knee 

in the past.  No surgeries.


TRAVEL OUTSIDE OF THE [place] IN LAST 30 DAYS: No





- Related Data


Allergies/Adverse Reactions: 


                                        





tramadol Allergy (Verified 10/26/19 15:07)


   











Past Medical History





- General


Information source: Patient





- Social History


Smoking Status: Current Every Day Smoker


Family History: Reviewed & Not Pertinent





- Past Medical History


Cardiac Medical History: Reports: Hx Hypertension - not taking meds


Pulmonary Medical History: Reports: Hx Bronchitis, Hx COPD


Renal/ Medical History: Denies: Hx Peritoneal Dialysis


Musculoskeletal Medical History: Reports Hx Arthritis


Skin Medical History: Reports Hx Cellulitis


Psychiatric Medical History: Reports: Hx Depression


Past Surgical History: Reports: Hx Gynecologic Surgery - part of cervix, Hx 

Hysterectomy





- Immunizations


Hx Diphtheria, Pertussis, Tetanus Vaccination: Yes





Review of Systems





- Review of Systems


Notes: 





Constitutional:  No fevers. No chills.





EENT: No eye redness. No eye pain. No ear pain. No sore throat.





Cardiovascular:  No chest pain. No palpitations.





Respiratory: No cough. No shortness of breath. No respiratory distress.





Gastrointestinal: No abdominal pain. No nausea, vomiting, or diarrhea.





Genitourinary: Atraumatic. No lesions. No pain. No discharge.





Musculoskeletal: + right knee pain





Skin: No rash or lesions.





Lymphatic: No swollen lymph nodes.





Neurologic: No headache. No syncope.





Psychiatric: No suicidal or homicidal ideation.





Physical Exam





- Vital signs


Vitals: 





                                        











Temp Pulse Resp BP Pulse Ox


 


 97.8 F   86   16   159/96 H  100 


 


 10/23/20 15:58  10/23/20 15:58  10/23/20 15:58  10/23/20 15:58  10/23/20 15:58














- Notes


Notes: 





General: Well-developed, well-nourished. In no acute distress. Non-toxic 

appearing.





Cardiac: Well-perfused. Regular rate and rhythm. No murmurs, rubs, or gallops. 





Pulmonary: No respiratory distress. No cyanosis. Bilateral lung fiels are clear 

to auscultation.





Abdominal: Non-distended. Non-rigid. Bowels sounds are present in all four 

quadrants. No guarding or rebound.





HEENT: Head is atraumatic. Conjunctivae not reddened. No tearing. PERRL. EOMI. 

Orbits atraumatic. No periorbital swelling or erythema. Oropharynx is without 

erythema, swelling, or exudates.





Neck: Supple. No adenopathy. No meningismus.





Dermatologic: Warm with good turgor. No rash. Atraumatic.





Chest: Atraumatic. No chest wall tenderness to palpation.





Musculoskeletal: Right lower extremity is examined.  Right knee is diffusely 

tender to palpation.  Increased tenderness over the medial aspect.  There is no 

demonstrated ligamentous laxity.  Drawer signs are negative.  Tenderness with 

medial tension.  No point bony tenderness or deformity.  Distal neurovascular 

exam is intact





Genitourinary: Examination deferred





Neurologic: No gross neurologic deficits.





Psychiatric: Normal mood. 











Course





- Re-evaluation


Re-evalutation: 





10/23/20 19:26


Venous Doppler ultrasound negative.  X-ray of the knee is negative.  Physical 

exam concerning for internal derangement.  Will offer Ace wrap and crutches.  We

will give Ortho on-call if needed.





- Vital Signs


Vital signs: 





                                        











Temp Pulse Resp BP Pulse Ox


 


 97.8 F   86   16   159/96 H  100 


 


 10/23/20 15:58  10/23/20 15:58  10/23/20 15:58  10/23/20 15:58  10/23/20 15:58














- Diagnostic Test


Radiology reviewed: Reports reviewed





Discharge





- Discharge


Clinical Impression: 


 Elevated blood pressure reading





Internal derangement of knee


Qualifiers:


 Laterality: right Qualified Code(s): M23.91 - Unspecified internal derangement 

of right knee





Condition: Good


Disposition: HOME, SELF-CARE


Instructions:  Suspected Internal Knee Injury ([facility]), Ace Wrap ([facility]), Use of 

Crutches ([facility])


Prescriptions: 


Hydrocodone/Acetaminophen [Norco 5-325 mg Tablet] 1 tab PO Q6HP PRN #10 tablet


 PRN Reason: 


Forms:  Elevated Blood Pressure


Referrals: 


DANNY SANFORD JR, DO [ACTIVE [employer]ISIONAL [employer]FF] - Follow up as needed

## 2020-10-23 NOTE — RADIOLOGY REPORT (SQ)
EXAM DESCRIPTION:  VENOUS UNILATERAL LOWER



IMAGES COMPLETED DATE/TIME:  10/23/2020 6:26 pm



REASON FOR STUDY:  RLE pain



COMPARISON:  None.



TECHNIQUE:  Dynamic and static gray scale and color images acquired of the right leg venous system. S
elected spectral images acquired with additional compression and augmentation maneuvers. The contrala
teral common femoral vein and saphenofemoral junction were also imaged. Images stored on PACS.



LIMITATIONS:  None.



FINDINGS:  COMMON FEMORAL: Normal phasicity, compression and augmentation. No visualized echogenic ma
terial on gray scale. No defects on color images.

FEMORAL: Normal compression and augmentation. No visualized echogenic material on gray scale. No defe
cts on color images.

POPLITEAL: Normal compression, augmentation. No visualized echogenic material on gray scale. No defec
ts on color images.

CALF VESSELS: Normal compression, augmentation. No visualized echogenic material on gray scale. No de
fects on color images.

GSV and SSV: Normal compression, augmentation. No visualized echogenic material on gray scale. No def
ects on color images.

ANY DEEP VENOUS INSUFFICIENCY: Not evaluated.

ANY EVIDENCE OF POPLITEAL CYST: No.

OTHER: No other significant finding.

CONTRALATERAL COMMON FEMORAL VEIN AND SAPHENOFEMORAL JUNCTION:

Normal phasicity, compression and augmentation. No visualized echogenic material on gray scale. No de
fects on color images.



IMPRESSION:  NO EVIDENCE DVT OR SVT IN THE RIGHT LEG.



TECHNICAL DOCUMENTATION:  JOB ID:  [id number]

 2011 [employer]- All Rights Reserved



Reading location - IP/workstation name: [email or web address]

## 2023-04-21 NOTE — EKG REPORT
SEVERITY:- NORMAL ECG -

SINUS RHYTHM

:

Confirmed by: Maria Luisa Rm MD 26-Oct-2019 21:26:22 Other (Specify)